# Patient Record
Sex: FEMALE | Race: WHITE | Employment: OTHER | URBAN - METROPOLITAN AREA
[De-identification: names, ages, dates, MRNs, and addresses within clinical notes are randomized per-mention and may not be internally consistent; named-entity substitution may affect disease eponyms.]

---

## 2017-10-27 ENCOUNTER — HOSPITAL ENCOUNTER (OUTPATIENT)
Dept: LAB | Age: 67
Discharge: HOME | End: 2017-10-27
Payer: COMMERCIAL

## 2017-10-27 LAB
25(OH)D3 SERPL-MCNC: 14.3 NG/ML (ref 30–100)
ALBUMIN SERPL-MCNC: 4.1 G/DL (ref 3.4–5)
ALBUMIN/GLOB SERPL: 1.1 {RATIO} (ref 0.8–1.7)
ALP SERPL-CCNC: 99 U/L (ref 45–117)
ALT SERPL-CCNC: 23 U/L (ref 13–56)
ANION GAP SERPL CALC-SCNC: 9 MMOL/L (ref 3–18)
APPEARANCE UR: CLEAR
AST SERPL-CCNC: 27 U/L (ref 15–37)
BACTERIA URNS QL MICRO: ABNORMAL /HPF
BASOPHILS # BLD: 0 K/UL (ref 0–0.06)
BASOPHILS NFR BLD: 1 % (ref 0–2)
BILIRUB SERPL-MCNC: 0.3 MG/DL (ref 0.2–1)
BILIRUB UR QL: NEGATIVE
BUN SERPL-MCNC: 21 MG/DL (ref 7–18)
BUN/CREAT SERPL: 24 (ref 12–20)
CALCIUM SERPL-MCNC: 9.3 MG/DL (ref 8.5–10.1)
CHLORIDE SERPL-SCNC: 103 MMOL/L (ref 100–108)
CHOLEST SERPL-MCNC: 190 MG/DL
CO2 SERPL-SCNC: 28 MMOL/L (ref 21–32)
COLOR UR: YELLOW
CREAT SERPL-MCNC: 0.88 MG/DL (ref 0.6–1.3)
DIFFERENTIAL METHOD BLD: ABNORMAL
EOSINOPHIL # BLD: 0.1 K/UL (ref 0–0.4)
EOSINOPHIL NFR BLD: 2 % (ref 0–5)
EPITH CASTS URNS QL MICRO: ABNORMAL /LPF (ref 0–5)
ERYTHROCYTE [DISTWIDTH] IN BLOOD BY AUTOMATED COUNT: 12.3 % (ref 11.6–14.5)
GLOBULIN SER CALC-MCNC: 3.6 G/DL (ref 2–4)
GLUCOSE SERPL-MCNC: 86 MG/DL (ref 74–99)
GLUCOSE UR STRIP.AUTO-MCNC: NEGATIVE MG/DL
HCT VFR BLD AUTO: 36.7 % (ref 35–45)
HDLC SERPL-MCNC: 108 MG/DL (ref 40–60)
HDLC SERPL: 1.8 {RATIO} (ref 0–5)
HGB BLD-MCNC: 12.4 G/DL (ref 12–16)
HGB UR QL STRIP: NEGATIVE
KETONES UR QL STRIP.AUTO: NEGATIVE MG/DL
LDLC SERPL CALC-MCNC: 68.2 MG/DL (ref 0–100)
LEUKOCYTE ESTERASE UR QL STRIP.AUTO: ABNORMAL
LIPID PROFILE,FLP: ABNORMAL
LYMPHOCYTES # BLD: 3.4 K/UL (ref 0.9–3.6)
LYMPHOCYTES NFR BLD: 46 % (ref 21–52)
MCH RBC QN AUTO: 31.1 PG (ref 24–34)
MCHC RBC AUTO-ENTMCNC: 33.8 G/DL (ref 31–37)
MCV RBC AUTO: 92 FL (ref 74–97)
MONOCYTES # BLD: 0.4 K/UL (ref 0.05–1.2)
MONOCYTES NFR BLD: 6 % (ref 3–10)
NEUTS SEG # BLD: 3.3 K/UL (ref 1.8–8)
NEUTS SEG NFR BLD: 45 % (ref 40–73)
NITRITE UR QL STRIP.AUTO: NEGATIVE
PH UR STRIP: 7 [PH] (ref 5–8)
PLATELET # BLD AUTO: 313 K/UL (ref 135–420)
PMV BLD AUTO: 9.6 FL (ref 9.2–11.8)
POTASSIUM SERPL-SCNC: 3.8 MMOL/L (ref 3.5–5.5)
PROT SERPL-MCNC: 7.7 G/DL (ref 6.4–8.2)
PROT UR STRIP-MCNC: NEGATIVE MG/DL
RBC # BLD AUTO: 3.99 M/UL (ref 4.2–5.3)
RBC #/AREA URNS HPF: 0 /HPF (ref 0–5)
SODIUM SERPL-SCNC: 140 MMOL/L (ref 136–145)
SP GR UR REFRACTOMETRY: 1.02 (ref 1–1.03)
TRIGL SERPL-MCNC: 69 MG/DL (ref ?–150)
TSH SERPL DL<=0.05 MIU/L-ACNC: 1.49 UIU/ML (ref 0.36–3.74)
UROBILINOGEN UR QL STRIP.AUTO: 0.2 EU/DL (ref 0.2–1)
VLDLC SERPL CALC-MCNC: 13.8 MG/DL
WBC # BLD AUTO: 7.3 K/UL (ref 4.6–13.2)
WBC URNS QL MICRO: ABNORMAL /HPF (ref 0–4)

## 2017-10-27 PROCEDURE — 82306 VITAMIN D 25 HYDROXY: CPT | Performed by: INTERNAL MEDICINE

## 2017-10-27 PROCEDURE — 80061 LIPID PANEL: CPT | Performed by: INTERNAL MEDICINE

## 2017-10-27 PROCEDURE — 84443 ASSAY THYROID STIM HORMONE: CPT | Performed by: INTERNAL MEDICINE

## 2017-10-27 PROCEDURE — 36415 COLL VENOUS BLD VENIPUNCTURE: CPT | Performed by: INTERNAL MEDICINE

## 2017-10-27 PROCEDURE — 85025 COMPLETE CBC W/AUTO DIFF WBC: CPT | Performed by: INTERNAL MEDICINE

## 2017-10-27 PROCEDURE — 80053 COMPREHEN METABOLIC PANEL: CPT | Performed by: INTERNAL MEDICINE

## 2017-10-27 PROCEDURE — 81001 URINALYSIS AUTO W/SCOPE: CPT | Performed by: INTERNAL MEDICINE

## 2017-11-01 ENCOUNTER — HOSPITAL ENCOUNTER (OUTPATIENT)
Dept: MAMMOGRAPHY | Age: 67
Discharge: HOME | End: 2017-11-01
Attending: INTERNAL MEDICINE
Payer: MEDICARE

## 2017-11-01 DIAGNOSIS — Z12.31 VISIT FOR SCREENING MAMMOGRAM: ICD-10-CM

## 2017-11-01 PROCEDURE — 77063 BREAST TOMOSYNTHESIS BI: CPT

## 2018-11-08 ENCOUNTER — HOSPITAL ENCOUNTER (OUTPATIENT)
Dept: LAB | Age: 68
Discharge: HOME | End: 2018-11-08
Payer: COMMERCIAL

## 2018-11-08 LAB
ANION GAP SERPL CALC-SCNC: 10 MMOL/L (ref 3–18)
BUN SERPL-MCNC: 34 MG/DL (ref 7–18)
BUN/CREAT SERPL: 34 (ref 12–20)
CALCIUM SERPL-MCNC: 9.1 MG/DL (ref 8.5–10.1)
CHLORIDE SERPL-SCNC: 108 MMOL/L (ref 100–108)
CO2 SERPL-SCNC: 25 MMOL/L (ref 21–32)
CREAT SERPL-MCNC: 1 MG/DL (ref 0.6–1.3)
GLUCOSE SERPL-MCNC: 89 MG/DL (ref 74–99)
POTASSIUM SERPL-SCNC: 3.9 MMOL/L (ref 3.5–5.5)
SODIUM SERPL-SCNC: 143 MMOL/L (ref 136–145)

## 2018-11-08 PROCEDURE — 80048 BASIC METABOLIC PNL TOTAL CA: CPT

## 2018-11-08 PROCEDURE — 36415 COLL VENOUS BLD VENIPUNCTURE: CPT

## 2019-01-04 ENCOUNTER — HOSPITAL ENCOUNTER (OUTPATIENT)
Dept: MAMMOGRAPHY | Age: 69
Discharge: HOME | End: 2019-01-04
Attending: INTERNAL MEDICINE
Payer: MEDICARE

## 2019-01-04 DIAGNOSIS — Z12.31 VISIT FOR SCREENING MAMMOGRAM: ICD-10-CM

## 2019-01-04 PROCEDURE — 77063 BREAST TOMOSYNTHESIS BI: CPT

## 2019-06-03 ENCOUNTER — HOSPITAL ENCOUNTER (OUTPATIENT)
Dept: CT IMAGING | Age: 69
Discharge: HOME | End: 2019-06-03
Attending: NURSE PRACTITIONER

## 2019-06-03 DIAGNOSIS — R91.1 PULMONARY NODULE: ICD-10-CM

## 2019-10-09 ENCOUNTER — HOSPITAL ENCOUNTER (INPATIENT)
Age: 69
LOS: 2 days | Discharge: HOME HEALTH | DRG: 482 | End: 2019-10-11
Attending: EMERGENCY MEDICINE | Admitting: HOSPITALIST
Payer: MEDICARE

## 2019-10-09 ENCOUNTER — APPOINTMENT (OUTPATIENT)
Dept: GENERAL RADIOLOGY | Age: 69
DRG: 482 | End: 2019-10-09
Attending: EMERGENCY MEDICINE
Payer: MEDICARE
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
## 2019-10-09 DIAGNOSIS — S72.002A CLOSED FRACTURE OF NECK OF LEFT FEMUR, INITIAL ENCOUNTER (HCC): Primary | ICD-10-CM

## 2019-10-09 DIAGNOSIS — I10 ESSENTIAL HYPERTENSION: ICD-10-CM

## 2019-10-09 PROBLEM — S72.009A HIP FRACTURE (HCC): Status: ACTIVE | Noted: 2019-10-09

## 2019-10-09 LAB
ABO + RH BLD: NORMAL
ALBUMIN SERPL-MCNC: 4.4 G/DL (ref 3.4–5)
ALBUMIN/GLOB SERPL: 1.3 {RATIO} (ref 0.8–1.7)
ALP SERPL-CCNC: 84 U/L (ref 45–117)
ALT SERPL-CCNC: 21 U/L (ref 13–56)
ANION GAP SERPL CALC-SCNC: 7 MMOL/L (ref 3–18)
APPEARANCE UR: CLEAR
APTT PPP: 32.1 SEC (ref 23–36.4)
AST SERPL-CCNC: 21 U/L (ref 10–38)
BACTERIA URNS QL MICRO: NEGATIVE /HPF
BASOPHILS # BLD: 0 K/UL (ref 0–0.1)
BASOPHILS NFR BLD: 0 % (ref 0–2)
BILIRUB SERPL-MCNC: 0.3 MG/DL (ref 0.2–1)
BILIRUB UR QL: NEGATIVE
BLOOD GROUP ANTIBODIES SERPL: NORMAL
BUN SERPL-MCNC: 38 MG/DL (ref 7–18)
BUN/CREAT SERPL: 34 (ref 12–20)
CALCIUM SERPL-MCNC: 9.9 MG/DL (ref 8.5–10.1)
CHLORIDE SERPL-SCNC: 103 MMOL/L (ref 100–111)
CO2 SERPL-SCNC: 27 MMOL/L (ref 21–32)
COLOR UR: YELLOW
CREAT SERPL-MCNC: 1.12 MG/DL (ref 0.6–1.3)
DIFFERENTIAL METHOD BLD: ABNORMAL
EOSINOPHIL # BLD: 0.1 K/UL (ref 0–0.4)
EOSINOPHIL NFR BLD: 1 % (ref 0–5)
EPITH CASTS URNS QL MICRO: NORMAL /LPF (ref 0–5)
ERYTHROCYTE [DISTWIDTH] IN BLOOD BY AUTOMATED COUNT: 12.4 % (ref 11.6–14.5)
GLOBULIN SER CALC-MCNC: 3.4 G/DL (ref 2–4)
GLUCOSE SERPL-MCNC: 112 MG/DL (ref 74–99)
GLUCOSE UR STRIP.AUTO-MCNC: NEGATIVE MG/DL
HCT VFR BLD AUTO: 36.1 % (ref 35–45)
HGB BLD-MCNC: 12 G/DL (ref 12–16)
HGB UR QL STRIP: NEGATIVE
INR PPP: 1 (ref 0.8–1.2)
KETONES UR QL STRIP.AUTO: NEGATIVE MG/DL
LEUKOCYTE ESTERASE UR QL STRIP.AUTO: NEGATIVE
LYMPHOCYTES # BLD: 2.1 K/UL (ref 0.9–3.6)
LYMPHOCYTES NFR BLD: 20 % (ref 21–52)
MCH RBC QN AUTO: 30.4 PG (ref 24–34)
MCHC RBC AUTO-ENTMCNC: 33.2 G/DL (ref 31–37)
MCV RBC AUTO: 91.4 FL (ref 74–97)
MONOCYTES # BLD: 0.7 K/UL (ref 0.05–1.2)
MONOCYTES NFR BLD: 6 % (ref 3–10)
NEUTS SEG # BLD: 7.8 K/UL (ref 1.8–8)
NEUTS SEG NFR BLD: 73 % (ref 40–73)
NITRITE UR QL STRIP.AUTO: NEGATIVE
PH UR STRIP: 5 [PH] (ref 5–8)
PLATELET # BLD AUTO: 278 K/UL (ref 135–420)
PMV BLD AUTO: 9.9 FL (ref 9.2–11.8)
POTASSIUM SERPL-SCNC: 3.8 MMOL/L (ref 3.5–5.5)
PROT SERPL-MCNC: 7.8 G/DL (ref 6.4–8.2)
PROT UR STRIP-MCNC: ABNORMAL MG/DL
PROTHROMBIN TIME: 13.2 SEC (ref 11.5–15.2)
RBC # BLD AUTO: 3.95 M/UL (ref 4.2–5.3)
RBC #/AREA URNS HPF: 0 /HPF (ref 0–5)
SODIUM SERPL-SCNC: 137 MMOL/L (ref 136–145)
SP GR UR REFRACTOMETRY: 1.02 (ref 1–1.03)
SPECIMEN EXP DATE BLD: NORMAL
UROBILINOGEN UR QL STRIP.AUTO: 0.2 EU/DL (ref 0.2–1)
WBC # BLD AUTO: 10.8 K/UL (ref 4.6–13.2)
WBC URNS QL MICRO: NORMAL /HPF (ref 0–4)

## 2019-10-09 PROCEDURE — 86900 BLOOD TYPING SEROLOGIC ABO: CPT

## 2019-10-09 PROCEDURE — 74011250636 HC RX REV CODE- 250/636

## 2019-10-09 PROCEDURE — 93005 ELECTROCARDIOGRAM TRACING: CPT

## 2019-10-09 PROCEDURE — 96374 THER/PROPH/DIAG INJ IV PUSH: CPT

## 2019-10-09 PROCEDURE — 80053 COMPREHEN METABOLIC PANEL: CPT

## 2019-10-09 PROCEDURE — 96375 TX/PRO/DX INJ NEW DRUG ADDON: CPT

## 2019-10-09 PROCEDURE — 85025 COMPLETE CBC W/AUTO DIFF WBC: CPT

## 2019-10-09 PROCEDURE — 85610 PROTHROMBIN TIME: CPT

## 2019-10-09 PROCEDURE — 81001 URINALYSIS AUTO W/SCOPE: CPT

## 2019-10-09 PROCEDURE — 99285 EMERGENCY DEPT VISIT HI MDM: CPT

## 2019-10-09 PROCEDURE — 73502 X-RAY EXAM HIP UNI 2-3 VIEWS: CPT

## 2019-10-09 PROCEDURE — 85730 THROMBOPLASTIN TIME PARTIAL: CPT

## 2019-10-09 PROCEDURE — 77030038269 HC DRN EXT URIN PURWCK BARD -A

## 2019-10-09 PROCEDURE — 74011250636 HC RX REV CODE- 250/636: Performed by: HOSPITALIST

## 2019-10-09 PROCEDURE — 65270000029 HC RM PRIVATE

## 2019-10-09 PROCEDURE — 74011250637 HC RX REV CODE- 250/637: Performed by: HOSPITALIST

## 2019-10-09 RX ORDER — LISINOPRIL 40 MG/1
40 TABLET ORAL DAILY
Status: DISCONTINUED | OUTPATIENT
Start: 2019-10-09 | End: 2019-10-09

## 2019-10-09 RX ORDER — MORPHINE SULFATE 2 MG/ML
2 INJECTION, SOLUTION INTRAMUSCULAR; INTRAVENOUS
Status: DISCONTINUED | OUTPATIENT
Start: 2019-10-09 | End: 2019-10-11 | Stop reason: HOSPADM

## 2019-10-09 RX ORDER — SIMVASTATIN 40 MG/1
40 TABLET, FILM COATED ORAL
Status: DISCONTINUED | OUTPATIENT
Start: 2019-10-09 | End: 2019-10-11 | Stop reason: HOSPADM

## 2019-10-09 RX ORDER — HYDROCHLOROTHIAZIDE 25 MG/1
25 TABLET ORAL DAILY
Status: DISCONTINUED | OUTPATIENT
Start: 2019-10-09 | End: 2019-10-09

## 2019-10-09 RX ORDER — MORPHINE SULFATE 2 MG/ML
2 INJECTION, SOLUTION INTRAMUSCULAR; INTRAVENOUS
Status: COMPLETED | OUTPATIENT
Start: 2019-10-09 | End: 2019-10-09

## 2019-10-09 RX ORDER — ACETAMINOPHEN 325 MG/1
650 TABLET ORAL
Status: DISCONTINUED | OUTPATIENT
Start: 2019-10-09 | End: 2019-10-11 | Stop reason: HOSPADM

## 2019-10-09 RX ORDER — HEPARIN SODIUM 5000 [USP'U]/ML
5000 INJECTION, SOLUTION INTRAVENOUS; SUBCUTANEOUS EVERY 8 HOURS
Status: DISCONTINUED | OUTPATIENT
Start: 2019-10-09 | End: 2019-10-11 | Stop reason: HOSPADM

## 2019-10-09 RX ORDER — HYDROCHLOROTHIAZIDE 25 MG/1
25 TABLET ORAL DAILY
Status: DISCONTINUED | OUTPATIENT
Start: 2019-10-10 | End: 2019-10-09

## 2019-10-09 RX ORDER — HYDROCHLOROTHIAZIDE 25 MG/1
25 TABLET ORAL DAILY
Status: DISCONTINUED | OUTPATIENT
Start: 2019-10-10 | End: 2019-10-11 | Stop reason: HOSPADM

## 2019-10-09 RX ORDER — HYDRALAZINE HYDROCHLORIDE 20 MG/ML
10 INJECTION INTRAMUSCULAR; INTRAVENOUS ONCE
Status: COMPLETED | OUTPATIENT
Start: 2019-10-10 | End: 2019-10-10

## 2019-10-09 RX ORDER — MORPHINE SULFATE 2 MG/ML
INJECTION, SOLUTION INTRAMUSCULAR; INTRAVENOUS
Status: COMPLETED
Start: 2019-10-09 | End: 2019-10-09

## 2019-10-09 RX ORDER — SODIUM CHLORIDE 9 MG/ML
75 INJECTION, SOLUTION INTRAVENOUS CONTINUOUS
Status: DISCONTINUED | OUTPATIENT
Start: 2019-10-09 | End: 2019-10-09

## 2019-10-09 RX ORDER — LISINOPRIL 40 MG/1
40 TABLET ORAL DAILY
Status: DISCONTINUED | OUTPATIENT
Start: 2019-10-10 | End: 2019-10-11 | Stop reason: HOSPADM

## 2019-10-09 RX ORDER — LISINOPRIL 20 MG/1
20 TABLET ORAL DAILY
COMMUNITY

## 2019-10-09 RX ORDER — LISINOPRIL 40 MG/1
40 TABLET ORAL DAILY
Status: DISCONTINUED | OUTPATIENT
Start: 2019-10-10 | End: 2019-10-09

## 2019-10-09 RX ORDER — ONDANSETRON 2 MG/ML
4 INJECTION INTRAMUSCULAR; INTRAVENOUS
Status: COMPLETED | OUTPATIENT
Start: 2019-10-09 | End: 2019-10-09

## 2019-10-09 RX ORDER — ONDANSETRON 2 MG/ML
INJECTION INTRAMUSCULAR; INTRAVENOUS
Status: COMPLETED
Start: 2019-10-09 | End: 2019-10-09

## 2019-10-09 RX ORDER — NALOXONE HYDROCHLORIDE 0.4 MG/ML
0.4 INJECTION, SOLUTION INTRAMUSCULAR; INTRAVENOUS; SUBCUTANEOUS AS NEEDED
Status: DISCONTINUED | OUTPATIENT
Start: 2019-10-09 | End: 2019-10-11 | Stop reason: HOSPADM

## 2019-10-09 RX ORDER — SODIUM CHLORIDE 9 MG/ML
75 INJECTION, SOLUTION INTRAVENOUS CONTINUOUS
Status: DISCONTINUED | OUTPATIENT
Start: 2019-10-10 | End: 2019-10-11 | Stop reason: HOSPADM

## 2019-10-09 RX ADMIN — HEPARIN SODIUM 5000 UNITS: 5000 INJECTION INTRAVENOUS; SUBCUTANEOUS at 23:11

## 2019-10-09 RX ADMIN — SODIUM CHLORIDE 75 ML/HR: 900 INJECTION, SOLUTION INTRAVENOUS at 23:32

## 2019-10-09 RX ADMIN — ONDANSETRON 4 MG: 2 INJECTION INTRAMUSCULAR; INTRAVENOUS at 15:14

## 2019-10-09 RX ADMIN — MORPHINE SULFATE 2 MG: 2 INJECTION, SOLUTION INTRAMUSCULAR; INTRAVENOUS at 23:11

## 2019-10-09 RX ADMIN — MORPHINE SULFATE 2 MG: 2 INJECTION, SOLUTION INTRAMUSCULAR; INTRAVENOUS at 15:15

## 2019-10-09 RX ADMIN — SIMVASTATIN 40 MG: 40 TABLET, FILM COATED ORAL at 23:11

## 2019-10-09 NOTE — ED PROVIDER NOTES
EMERGENCY DEPARTMENT HISTORY AND PHYSICAL EXAM    2:58 PM      Date: 10/9/2019  Patient Name: Carolann Marsh    History of Presenting Illness     Chief Complaint   Patient presents with    Hip Pain    Fall         HISTORY: Carolann Marsh is a 77 y.o. female with medical history as listed below who presents with left hip pain since this morning after tripping and falling on a dog leash. It on her left hip. She has not been able to bear weight since the accident. A neighbor helped lift her up into her truck. She does not take a blood thinner. She does have a history of a right hip replacement. She denies hitting her head or losing consciousness. She denies pain elsewhere. She says the pain is only when she moves. PCP: Renu Cedeño MD    Current Outpatient Medications   Medication Sig Dispense Refill    lisinopril (PRINIVIL, ZESTRIL) 20 mg tablet Take 20 mg by mouth daily.  simvastatin (ZOCOR) 40 mg tablet       lisinopril-hydrochlorothiazide (PRINZIDE, ZESTORETIC) 20-25 mg per tablet          Past History     Past Medical History:  Past Medical History:   Diagnosis Date    Arthritis     Hypertension        Past Surgical History:  Past Surgical History:   Procedure Laterality Date    HX LUMBAR FUSION      1968    HX ORTHOPAEDIC      right hip replaced 2004       Family History:  Family History   Problem Relation Age of Onset    Breast Cancer Paternal Aunt        Social History:  Social History     Tobacco Use    Smoking status: Current Every Day Smoker     Packs/day: 0.25    Smokeless tobacco: Never Used   Substance Use Topics    Alcohol use: Yes     Comment: occassional    Drug use: No       Allergies: Allergies   Allergen Reactions    Codeine Itching     In addition to itching, pt said it feels like bugs are crawling in her whole body           Review of Systems       Review of Systems   Constitutional: Negative for chills. HENT: Negative for congestion and sore throat. Respiratory: Negative for cough and shortness of breath. Cardiovascular: Negative for chest pain. Gastrointestinal: Negative for abdominal pain, diarrhea, nausea and vomiting. Genitourinary: Negative for dysuria. Musculoskeletal: Positive for arthralgias. Negative for back pain. Skin: Negative for rash. Neurological: Negative for dizziness and headaches. All other systems reviewed and are negative. Physical Exam     Visit Vitals  BP (!) 214/104 (BP 1 Location: Right arm, BP Patient Position: At rest;Sitting) Comment: took meds today   Pulse (!) 103   Temp 98.8 °F (37.1 °C)   Resp 16   Ht 5' 1\" (1.549 m)   Wt 40.8 kg (90 lb)   SpO2 98%   BMI 17.01 kg/m²     Physical Exam  General Exam: Patient is a well developed and well nourished in no distress. Patient does not appear acutely ill or toxic. Elderly, frail-appearing  Eye Exam: Lids and conjunctiva are normal  ENT Exam: The general head and facial exam is normal.  The neck is supple without meningeal signs. No significant adenopathy. Pulmonary Exam: No respiratory distress. The respiratory rate is normal.  No stridor. The breath sounds are equal bilaterally. There are no wheezes, rales, or rhonchi noted. Cardiac Exam: The cardiac rate and rhythm are normal.  No significant murmurs, rubs, or gallops. The peripheral pulses of the upper extremities are normal.  Abdominal Exam: Abdomen is soft and non-distended. No pulsatile masses. There is no local tenderness. There is no rebound or guarding noted. Skin and Soft Tissue: The skin is warm and dry, without significant abnormality. Good color. Musculoskeletal Exam: No peripheral edema. Left hip pain, remedy does not appear shortened or rotated, intact distal pulses, sensation and motor intact distally  Psychiatric: Normal adult with appropriate demeanor and interpersonal interaction. Is oriented to person, place, and time.       Diagnostic Study Results     Labs -  No results found for this or any previous visit (from the past 12 hour(s)). Radiologic Studies -   XR HIP LT W OR WO PELV 2-3 VWS   Final Result   IMPRESSION:      Subtle impacted subcapital femoral neck fracture. Medical Decision Making   I am the first provider for this patient. I reviewed the vital signs, available nursing notes, past medical history, past surgical history, family history and social history. Vital Signs-Reviewed the patient's vital signs. EKG: Interpreted by the EP. EKG performed at 1642. Interpretation 95, normal sinus rhythm, no STEMI, no ST depressions    Records Reviewed: Nursing Notes (Time of Review: 2:58 PM)    ED Course: Progress Notes, Reevaluation, and Consults:      Provider Notes (Medical Decision Making): Patient presenting for evaluation of hip pain. She had a mechanical fall at home. She did not hit her head. She denies any precipitating symptoms causing her to fall and states it was mechanical by tripping over her dog leash. X-ray demonstrates a left femoral neck fracture. Neurovascularly intact. She is not on a blood thinner. Blood pressure is elevated but does slightly come down after receiving pain medication. Do not think she needs this emergently lowered. Labs reviewed. Case discussed with orthopedic surgery and hospitalist.  Patient agreeable to admission. Diagnosis     Clinical Impression:   1. Closed fracture of neck of left femur, initial encounter ([facility] [address]    2. Essential hypertension    3. Fall on same level from slipping, tripping or stumbling, initial encounter        Disposition: ADMIT    Follow-up Information    None          Patient's Medications   Start Taking    No medications on file   Continue Taking    LISINOPRIL (PRINIVIL, ZESTRIL) 20 MG TABLET    Take 20 mg by mouth daily.     LISINOPRIL-HYDROCHLOROTHIAZIDE (PRINZIDE, ZESTORETIC) 20-25 MG PER TABLET        SIMVASTATIN (ZOCOR) 40 MG TABLET       These Medications have changed    No medications on file   Stop Taking    LORAZEPAM (ATIVAN) 0.5 MG TABLET        OXYCODONE-ACETAMINOPHEN (PERCOCET) 5-325 MG PER TABLET    Take 1 tablet every 4-6 hours as needed for pain control. If you were instructed to try over the counter ibuprofen or tylenol, only take the percocet for pain not controlled with the over the counter medication. _______________________________    Please note that this dictation was completed with Done., the computer voice recognition software. Quite often unanticipated grammatical, syntax, homophones, and other interpretive errors are inadvertently transcribed by the computer software. Please disregard these errors. Please excuse any errors that have escaped final proofreading.

## 2019-10-09 NOTE — ED NOTES
Patient is now leaving for inpatient unit. Water has been given per 1530 order for regular diet. No acute distress noted to patient at this time. Vitals stable. BP at home is 145-179 per spouse. Taken to in patient room via ED tech. Patient's spouse has belongings.

## 2019-10-09 NOTE — H&P
History and Physical    Patient: Chata Contreras               Sex: female          DOA: 10/9/2019       YOB: 1950      Age:  77 y.o.        LOS:  LOS: 0 days        CHIEF COMPLAINT: Hip Pain and Fall    Assessment/Plan:     Active Problems:    Hip fracture ([address] (10/9/2019)      Essential hypertension (10/9/2019)      PLAN:  Left hip fracture  Ortho consulted, NPO at midnight  Pain control  IV fluids    Pre-operative cardiac and pulmonary assessment  Surgery: left hip hemiarthroplasty? RCRI = 0  (0.4% risk of major cardiac event)  METS >4  ECG: normal sinus rhythm, no pathologic Q waves    No angina/angina-equivalent/CHF symptoms  No h/o COPD/asthma, or home O2 requirement  No h/o CKD or DM2  No h/o CVA or TIA    Recommendations:  Pt is at low-moderate risk for major linda-operative cardiac events mostly due to age. Given surgery is a moderate risk surgery and for an emergent/urgent condition, patient is at appropriate risk to proceed with no absolute contraindications at this time. Essential HTN  Elevated, likely due to pain. Cont home BP meds and monitor closely    Tobacco abuse  Chronic smoker, recommend to quit smoking    DVT prophylaxis  SubQ heparin      HPI:     Chata Contreras is a 77 y.o. female with PMH of HTN who presented to the ED due to fall and hip pain. She said she was taking her dog to the vet when she tripped over the leash and landed on her left hip. She developed severe pain, she was able to get up and sat in the sofa with the help of her friend. She was brought to the ED for evaluation. XR shows subtle impacted subcapital femoral neck fracture. Ortho consulted, pt was admitted for further management. Prior to today pt states she is active and is able to walk and go up a flight of stairs without chest pain or SOB.      Past Medical History:   Diagnosis Date    Arthritis     Hypertension        Social History:  Social History     Socioeconomic History    Marital status: SINGLE     Spouse name: Not on file    Number of children: Not on file    Years of education: Not on file    Highest education level: Not on file   Occupational History    Not on file   Social Needs    Financial resource strain: Not on file    Food insecurity:     Worry: Not on file     Inability: Not on file    Transportation needs:     Medical: Not on file     Non-medical: Not on file   Tobacco Use    Smoking status: Current Every Day Smoker     Packs/day: 0.25    Smokeless tobacco: Never Used   Substance and Sexual Activity    Alcohol use: Yes     Comment: occassional    Drug use: No    Sexual activity: Not on file   Lifestyle    Physical activity:     Days per week: Not on file     Minutes per session: Not on file    Stress: Not on file   Relationships    Social connections:     Talks on phone: Not on file     Gets together: Not on file     Attends Sabianism service: Not on file     Active member of club or organization: Not on file     Attends meetings of clubs or organizations: Not on file     Relationship status: Not on file    Intimate partner violence:     Fear of current or ex partner: Not on file     Emotionally abused: Not on file     Physically abused: Not on file     Forced sexual activity: Not on file   Other Topics Concern    Not on file   Social History Narrative    Not on file       Family History:  Family History   Problem Relation Age of Onset    Breast Cancer Paternal Aunt        Allergies: Allergies   Allergen Reactions    Codeine Itching     In addition to itching, pt said it feels like bugs are crawling in her whole body         Home Medications:  Prior to Admission medications    Medication Sig Start Date End Date Taking? Authorizing Provider   lisinopril (PRINIVIL, ZESTRIL) 20 mg tablet Take 20 mg by mouth daily.    Yes Other, MD Dada   simvastatin (ZOCOR) 40 mg tablet  8/11/14   Other, MD Dada   lisinopril-hydrochlorothiazide (PRINZIDE, ZESTORETIC) 20-25 mg per tablet 7/25/14   OtherDada MD         Review of Systems  Review of Systems   Constitutional: Negative for chills and fever. HENT: Negative for congestion and hearing loss. Eyes: Negative for blurred vision and double vision. Respiratory: Negative for cough and shortness of breath. Cardiovascular: Negative for chest pain and palpitations. Gastrointestinal: Negative for abdominal pain, nausea and vomiting. Genitourinary: Negative for dysuria and hematuria. Musculoskeletal: Positive for joint pain. Negative for falls and myalgias. Skin: Negative for rash. Neurological: Negative for dizziness and focal weakness. Psychiatric/Behavioral: Negative. Objective:      Visit Vitals  BP (!) 179/101   Pulse 95   Temp 98.5 °F (36.9 °C)   Resp 20   Ht 5' 1\" (1.549 m)   Wt 40.8 kg (90 lb)   SpO2 97%   BMI 17.01 kg/m²       Physical Exam:  Ears: hearing is intact  Eyes: Icterus is not present  Lungs: clear to auscultation bilaterally  Heart: regular rate and rhythm, S1, S2 normal, no murmur, click, rub or gallop  Gastrointestinal: soft, non-tender.  Bowel sounds normal. No masses,  no organomegaly  Neurological:  New Focal Deficits are not present  Psychiatric:  Mood is stable    Laboratory Studies:  CMP:   Lab Results   Component Value Date/Time     10/09/2019 03:23 PM    K 3.8 10/09/2019 03:23 PM     10/09/2019 03:23 PM    CO2 27 10/09/2019 03:23 PM    AGAP 7 10/09/2019 03:23 PM     (H) 10/09/2019 03:23 PM    BUN 38 (H) 10/09/2019 03:23 PM    CREA 1.12 10/09/2019 03:23 PM    GFRAA 58 (L) 10/09/2019 03:23 PM    GFRNA 48 (L) 10/09/2019 03:23 PM    CA 9.9 10/09/2019 03:23 PM    ALB 4.4 10/09/2019 03:23 PM    TP 7.8 10/09/2019 03:23 PM    GLOB 3.4 10/09/2019 03:23 PM    AGRAT 1.3 10/09/2019 03:23 PM    SGOT 21 10/09/2019 03:23 PM    ALT 21 10/09/2019 03:23 PM     CBC:   Lab Results   Component Value Date/Time    WBC 10.8 10/09/2019 03:23 PM    HGB 12.0 10/09/2019 03:23 PM    HCT 36.1 10/09/2019 03:23 PM     10/09/2019 03:23 PM     All Cardiac Markers in the last 24 hours: No results found for: CPK, CK, CKMMB, CKMB, RCK3, CKMBT, CKNDX, CKND1, JAMARCUS, TROPT, TROIQ, PURA, TROPT, TNIPOC, BNP, BNPP    Imaging:  XR HIP LT W OR WO PELV 2-3 VWS   Final Result   IMPRESSION:      Subtle impacted subcapital femoral neck fracture.

## 2019-10-10 ENCOUNTER — ANESTHESIA (OUTPATIENT)
Dept: SURGERY | Age: 69
DRG: 482 | End: 2019-10-10
Payer: MEDICARE

## 2019-10-10 ENCOUNTER — ANESTHESIA EVENT (OUTPATIENT)
Dept: SURGERY | Age: 69
DRG: 482 | End: 2019-10-10
Payer: MEDICARE

## 2019-10-10 ENCOUNTER — APPOINTMENT (OUTPATIENT)
Dept: GENERAL RADIOLOGY | Age: 69
DRG: 482 | End: 2019-10-10
Attending: ORTHOPAEDIC SURGERY
Payer: MEDICARE

## 2019-10-10 ENCOUNTER — APPOINTMENT (OUTPATIENT)
Dept: CT IMAGING | Age: 69
DRG: 482 | End: 2019-10-10
Attending: PHYSICIAN ASSISTANT
Payer: MEDICARE

## 2019-10-10 LAB
ANION GAP SERPL CALC-SCNC: 11 MMOL/L (ref 3–18)
ATRIAL RATE: 95 BPM
BASOPHILS # BLD: 0 K/UL (ref 0–0.1)
BASOPHILS NFR BLD: 0 % (ref 0–2)
BUN SERPL-MCNC: 22 MG/DL (ref 7–18)
BUN/CREAT SERPL: 30 (ref 12–20)
CALCIUM SERPL-MCNC: 8.6 MG/DL (ref 8.5–10.1)
CALCULATED P AXIS, ECG09: 71 DEGREES
CALCULATED R AXIS, ECG10: 19 DEGREES
CALCULATED T AXIS, ECG11: 60 DEGREES
CHLORIDE SERPL-SCNC: 104 MMOL/L (ref 100–111)
CO2 SERPL-SCNC: 24 MMOL/L (ref 21–32)
CREAT SERPL-MCNC: 0.73 MG/DL (ref 0.6–1.3)
DIAGNOSIS, 93000: NORMAL
DIFFERENTIAL METHOD BLD: ABNORMAL
EOSINOPHIL # BLD: 0 K/UL (ref 0–0.4)
EOSINOPHIL NFR BLD: 0 % (ref 0–5)
ERYTHROCYTE [DISTWIDTH] IN BLOOD BY AUTOMATED COUNT: 12.4 % (ref 11.6–14.5)
GLUCOSE SERPL-MCNC: 120 MG/DL (ref 74–99)
HCT VFR BLD AUTO: 33.5 % (ref 35–45)
HGB BLD-MCNC: 11.2 G/DL (ref 12–16)
LYMPHOCYTES # BLD: 1.5 K/UL (ref 0.9–3.6)
LYMPHOCYTES NFR BLD: 13 % (ref 21–52)
MAGNESIUM SERPL-MCNC: 1.3 MG/DL (ref 1.6–2.6)
MCH RBC QN AUTO: 30.4 PG (ref 24–34)
MCHC RBC AUTO-ENTMCNC: 33.4 G/DL (ref 31–37)
MCV RBC AUTO: 90.8 FL (ref 74–97)
MONOCYTES # BLD: 0.6 K/UL (ref 0.05–1.2)
MONOCYTES NFR BLD: 5 % (ref 3–10)
NEUTS SEG # BLD: 9.6 K/UL (ref 1.8–8)
NEUTS SEG NFR BLD: 82 % (ref 40–73)
P-R INTERVAL, ECG05: 130 MS
PHOSPHATE SERPL-MCNC: 2.5 MG/DL (ref 2.5–4.9)
PLATELET # BLD AUTO: 267 K/UL (ref 135–420)
PMV BLD AUTO: 10 FL (ref 9.2–11.8)
POTASSIUM SERPL-SCNC: 3.5 MMOL/L (ref 3.5–5.5)
Q-T INTERVAL, ECG07: 354 MS
QRS DURATION, ECG06: 70 MS
QTC CALCULATION (BEZET), ECG08: 444 MS
RBC # BLD AUTO: 3.69 M/UL (ref 4.2–5.3)
SODIUM SERPL-SCNC: 139 MMOL/L (ref 136–145)
VENTRICULAR RATE, ECG03: 95 BPM
WBC # BLD AUTO: 11.8 K/UL (ref 4.6–13.2)

## 2019-10-10 PROCEDURE — 83735 ASSAY OF MAGNESIUM: CPT

## 2019-10-10 PROCEDURE — 74011250636 HC RX REV CODE- 250/636: Performed by: HOSPITALIST

## 2019-10-10 PROCEDURE — 65270000029 HC RM PRIVATE

## 2019-10-10 PROCEDURE — 85025 COMPLETE CBC W/AUTO DIFF WBC: CPT

## 2019-10-10 PROCEDURE — 73700 CT LOWER EXTREMITY W/O DYE: CPT

## 2019-10-10 PROCEDURE — 80048 BASIC METABOLIC PNL TOTAL CA: CPT

## 2019-10-10 PROCEDURE — C1713 ANCHOR/SCREW BN/BN,TIS/BN: HCPCS | Performed by: ORTHOPAEDIC SURGERY

## 2019-10-10 PROCEDURE — 73502 X-RAY EXAM HIP UNI 2-3 VIEWS: CPT

## 2019-10-10 PROCEDURE — 74011250637 HC RX REV CODE- 250/637: Performed by: HOSPITALIST

## 2019-10-10 PROCEDURE — 74011000250 HC RX REV CODE- 250

## 2019-10-10 PROCEDURE — 74011000272 HC RX REV CODE- 272: Performed by: ORTHOPAEDIC SURGERY

## 2019-10-10 PROCEDURE — 76010000149 HC OR TIME 1 TO 1.5 HR: Performed by: ORTHOPAEDIC SURGERY

## 2019-10-10 PROCEDURE — 36415 COLL VENOUS BLD VENIPUNCTURE: CPT

## 2019-10-10 PROCEDURE — 77030018836 HC SOL IRR NACL ICUM -A: Performed by: ORTHOPAEDIC SURGERY

## 2019-10-10 PROCEDURE — 77030031139 HC SUT VCRL2 J&J -A: Performed by: ORTHOPAEDIC SURGERY

## 2019-10-10 PROCEDURE — 74011250636 HC RX REV CODE- 250/636: Performed by: ORTHOPAEDIC SURGERY

## 2019-10-10 PROCEDURE — 76060000033 HC ANESTHESIA 1 TO 1.5 HR: Performed by: ORTHOPAEDIC SURGERY

## 2019-10-10 PROCEDURE — 77030038269 HC DRN EXT URIN PURWCK BARD -A

## 2019-10-10 PROCEDURE — 74011250636 HC RX REV CODE- 250/636

## 2019-10-10 PROCEDURE — 74011250636 HC RX REV CODE- 250/636: Performed by: FAMILY MEDICINE

## 2019-10-10 PROCEDURE — 77030012510 HC MSK AIRWY LMA TELE -B: Performed by: ANESTHESIOLOGY

## 2019-10-10 PROCEDURE — 0QS704Z REPOSITION LEFT UPPER FEMUR WITH INTERNAL FIXATION DEVICE, OPEN APPROACH: ICD-10-PCS | Performed by: ORTHOPAEDIC SURGERY

## 2019-10-10 PROCEDURE — 77030013079 HC BLNKT BAIR HGGR 3M -A: Performed by: ANESTHESIOLOGY

## 2019-10-10 PROCEDURE — 84100 ASSAY OF PHOSPHORUS: CPT

## 2019-10-10 PROCEDURE — 77030008462 HC STPLR SKN PROX J&J -A: Performed by: ORTHOPAEDIC SURGERY

## 2019-10-10 PROCEDURE — 77030016458 HC BIT DRL CANN1 SYNT -F: Performed by: ORTHOPAEDIC SURGERY

## 2019-10-10 PROCEDURE — C1769 GUIDE WIRE: HCPCS | Performed by: ORTHOPAEDIC SURGERY

## 2019-10-10 PROCEDURE — 76210000006 HC OR PH I REC 0.5 TO 1 HR: Performed by: ORTHOPAEDIC SURGERY

## 2019-10-10 DEVICE — SCREW BNE L80MM DIA7.3MM THRD L16MM CANC S STL SELF DRL ST: Type: IMPLANTABLE DEVICE | Site: HIP | Status: FUNCTIONAL

## 2019-10-10 DEVICE — WASHER ORTH DIA13MM FOR CANN SCR: Type: IMPLANTABLE DEVICE | Site: HIP | Status: FUNCTIONAL

## 2019-10-10 DEVICE — SCREW BNE L75MM DIA7.3MM THRD L16MM CANC S STL ST SELF DRL: Type: IMPLANTABLE DEVICE | Site: HIP | Status: FUNCTIONAL

## 2019-10-10 RX ORDER — LABETALOL HCL 20 MG/4 ML
20 SYRINGE (ML) INTRAVENOUS
Status: DISCONTINUED | OUTPATIENT
Start: 2019-10-10 | End: 2019-10-11 | Stop reason: HOSPADM

## 2019-10-10 RX ORDER — CEFAZOLIN SODIUM IN 0.9 % NACL 2 G/100 ML
PLASTIC BAG, INJECTION (ML) INTRAVENOUS AS NEEDED
Status: DISCONTINUED | OUTPATIENT
Start: 2019-10-10 | End: 2019-10-10 | Stop reason: HOSPADM

## 2019-10-10 RX ORDER — LIDOCAINE HYDROCHLORIDE 20 MG/ML
INJECTION, SOLUTION EPIDURAL; INFILTRATION; INTRACAUDAL; PERINEURAL AS NEEDED
Status: DISCONTINUED | OUTPATIENT
Start: 2019-10-10 | End: 2019-10-10 | Stop reason: HOSPADM

## 2019-10-10 RX ORDER — FENTANYL CITRATE 50 UG/ML
INJECTION, SOLUTION INTRAMUSCULAR; INTRAVENOUS AS NEEDED
Status: DISCONTINUED | OUTPATIENT
Start: 2019-10-10 | End: 2019-10-10 | Stop reason: HOSPADM

## 2019-10-10 RX ORDER — LABETALOL HCL 20 MG/4 ML
20 SYRINGE (ML) INTRAVENOUS ONCE
Status: COMPLETED | OUTPATIENT
Start: 2019-10-10 | End: 2019-10-10

## 2019-10-10 RX ORDER — ONDANSETRON 2 MG/ML
4 INJECTION INTRAMUSCULAR; INTRAVENOUS
Status: DISCONTINUED | OUTPATIENT
Start: 2019-10-10 | End: 2019-10-11 | Stop reason: HOSPADM

## 2019-10-10 RX ORDER — SODIUM CHLORIDE, SODIUM LACTATE, POTASSIUM CHLORIDE, CALCIUM CHLORIDE 600; 310; 30; 20 MG/100ML; MG/100ML; MG/100ML; MG/100ML
75 INJECTION, SOLUTION INTRAVENOUS CONTINUOUS
Status: DISCONTINUED | OUTPATIENT
Start: 2019-10-11 | End: 2019-10-11 | Stop reason: HOSPADM

## 2019-10-10 RX ORDER — FENTANYL CITRATE 50 UG/ML
50 INJECTION, SOLUTION INTRAMUSCULAR; INTRAVENOUS
Status: DISCONTINUED | OUTPATIENT
Start: 2019-10-10 | End: 2019-10-11 | Stop reason: HOSPADM

## 2019-10-10 RX ORDER — PROPOFOL 10 MG/ML
INJECTION, EMULSION INTRAVENOUS AS NEEDED
Status: DISCONTINUED | OUTPATIENT
Start: 2019-10-10 | End: 2019-10-10 | Stop reason: HOSPADM

## 2019-10-10 RX ADMIN — LABETALOL 20 MG/4 ML (5 MG/ML) INTRAVENOUS SYRINGE 20 MG: at 09:06

## 2019-10-10 RX ADMIN — HYDRALAZINE HYDROCHLORIDE 10 MG: 20 INJECTION INTRAMUSCULAR; INTRAVENOUS at 00:00

## 2019-10-10 RX ADMIN — Medication 2 G: at 22:49

## 2019-10-10 RX ADMIN — LIDOCAINE HYDROCHLORIDE 50 MG: 20 INJECTION, SOLUTION EPIDURAL; INFILTRATION; INTRACAUDAL; PERINEURAL at 22:43

## 2019-10-10 RX ADMIN — FENTANYL CITRATE 50 MCG: 50 INJECTION, SOLUTION INTRAMUSCULAR; INTRAVENOUS at 23:01

## 2019-10-10 RX ADMIN — LISINOPRIL 40 MG: 40 TABLET ORAL at 09:06

## 2019-10-10 RX ADMIN — PROPOFOL 100 MG: 10 INJECTION, EMULSION INTRAVENOUS at 22:43

## 2019-10-10 RX ADMIN — SODIUM CHLORIDE 75 ML/HR: 900 INJECTION, SOLUTION INTRAVENOUS at 11:25

## 2019-10-10 RX ADMIN — FENTANYL CITRATE 50 MCG: 50 INJECTION, SOLUTION INTRAMUSCULAR; INTRAVENOUS at 22:36

## 2019-10-10 RX ADMIN — MORPHINE SULFATE 2 MG: 2 INJECTION, SOLUTION INTRAMUSCULAR; INTRAVENOUS at 13:28

## 2019-10-10 RX ADMIN — HYDROCHLOROTHIAZIDE 25 MG: 25 TABLET ORAL at 09:06

## 2019-10-10 RX ADMIN — SODIUM CHLORIDE: 900 INJECTION, SOLUTION INTRAVENOUS at 23:31

## 2019-10-10 NOTE — PROGRESS NOTES
conducted a pre-surgery visit with Makayla Jeff, who is a 77 y.o.,female. The  provided the following Interventions:  Initiated a relationship of care and support. Offered prayer and assurance of continued prayers on patient's behalf. Plan:  Chaplains will continue to follow and will provide pastoral care on an as needed/requested basis.  recommends bedside caregivers page  on duty if patient shows signs of acute spiritual or emotional distress.     Marlon Solorzano   Spiritual Care   [phone]

## 2019-10-10 NOTE — PROGRESS NOTES
Problem: Discharge Planning  Goal: *Discharge to safe environment  Outcome: Progressing Towards Goal    Care Management Interventions  PCP Verified by CM: Yes  Palliative Care Criteria Met (RRAT>21 & CHF Dx)?: No  Transition of Care Consult (CM Consult): Discharge Planning  Current Support Network: Other(Roomate)  Plan discussed with Pt/Family/Caregiver: Yes  Discharge Location  Discharge Placement: Other:(HH vs SNF)       Reason for Admission:   Hip fracture ([facility] [address] (10/9/2019)                    RRAT Score:   8                  Plan for utilizing home health:  HH Vs SNF                        Current Advanced Directive/Advance Care Plan: Not on file and not interested at this time. Transition of Care Plan:        Spoke with patient in room, she stated that she was independent prior to admission and used no DME. However has a walker, cane and crutches from prior events. She lives with a roommate. She verified her demographics, insurance and PCP as correct on the facesheet. Patient has designated __friend/ roommate Nakita Ta [phone], NOK sister Brielle Weiss __[phone]____________________ to participate in his/her discharge plan and to receive any needed information. Plan per patient is to return home. Possible hh vs SNF will need Pt/OT to evaluate needs. Possible OR today. Tenatively Matched to [facility] if SNF need Amy/Liason following and can accept awaiting PT/OT jazmin.

## 2019-10-10 NOTE — PROGRESS NOTES
NUTRITION INITIAL ASSESSMENT/PLAN OF CARE      RECOMMENDATIONS:   1. NPO: diet to be resumed when medically indicated and per Pt tolerance  2. Monitor labs, weight and PO intake  3. RD to follow     GOALS:   1. PO intake meets >75% of protein/calorie needs by 10/15  2. Weight Maintenance/gradual gain (1-2 lb/week) by 10/17      ASSESSMENT:   Wt status is classified as underweight per Body mass index is 17.01 kg/m². Currently NPO for surgery but adequate PO prior. Labs noted. Pt w/ hypomagnesemia and elevated BUN. Nutrition recommendations listed. RD to follow. Nutrition Diagnoses:   Inadequate PO intake related to scheduled surgery as evidenced by NPO diet order. Underweight related to energy intake and body frame as evidenced by a BMI of 17 and 86% IBW. Nutrition Risk:  [] High  [x] Moderate []  Low    SUBJECTIVE/OBJECTIVE:   Pt admitted for left hip fracture and HTN. Ortho consulted: plan for OR today for percutaneous pinning of left femoral neck fracture. Pt seen in room this morning resting. NKFA or problems chewing/swallowing and stable weight PTA. Reports having good appetite and normally consuming 3 meals per day at home. Stated she has been this weight the majority of her adult life. Menu provided for when diet resumed to implement preferences with dietary. Will continue to monitor. Information Obtained from:    [x] Chart Review   [x] Patient   [] Family/Caregiver   [] Nurse/Physician   [] Interdisciplinary Meeting/Rounds      Diet: NPO  Medications: [x] Reviewed  IVF: NS @75 mL/hr  Hydrodiuril, Zestril, Zocor    Allergies: [x] Reviewed   Encounter Diagnoses     ICD-10-CM ICD-9-CM   1. [facility] of left [facility] S72.002A 820.8   2. Essential hypertension I10 401.9   3. Fall on same level from slipping, tripping or stumbling, initial encounter [address]01. Barbara Hoyt G064.6     Past Medical History:   Diagnosis Date    Arthritis     Hypertension       Labs:    Lab Results   Component Value Date/Time    Sodium 139 10/10/2019 04:35 AM    Potassium 3.5 10/10/2019 04:35 AM    Chloride 104 10/10/2019 04:35 AM    CO2 24 10/10/2019 04:35 AM    Anion gap 11 10/10/2019 04:35 AM    Glucose 120 (H) 10/10/2019 04:35 AM    BUN 22 (H) 10/10/2019 04:35 AM    Creatinine 0.73 10/10/2019 04:35 AM    Calcium 8.6 10/10/2019 04:35 AM    Magnesium 1.3 (L) 10/10/2019 04:35 AM    Phosphorus 2.5 10/10/2019 04:35 AM    Albumin 4.4 10/09/2019 03:23 PM     Anthropometrics: BMI (calculated): 17  Last 3 Recorded Weights in this Encounter    10/09/19 1339   Weight: 40.8 kg (90 lb)      Ht Readings from Last 1 Encounters:   10/09/19 5' 1\" (1.549 m)       Documented Weight History:  Weight Metrics 10/9/2019 8/28/2014 8/27/2014 8/21/2014   Weight 90 lb - 78 lb 78 lb   BMI 17.01 kg/m2 15.23 kg/m2 - 15.23 kg/m2       Patient Vitals for the past 100 hrs:   % Diet Eaten   10/09/19 1824 90 %         IBW: 105 lb %IBW: 86% UBW: 90 lb   [] Weight Loss [] Weight Gain [x] Weight Stable    Estimated Nutrition Needs: [x] MSJ x 1.3 [x] Other: 35 kcal/kg  Calories: 5142-6765 kcal Based on:   [x] Actual BW    Protein:   60 g Based on:   [x] Actual BW    Fluid:       1500 ml Based on:   [x] Actual BW      [x] No Cultural, Faith or ethnic dietary need identified.     [] Cultural, Faith and ethnic food preferences identified and addressed     Wt Status:  [] Normal (18.6 - 24.9) [x] Underweight (<18.5) [] Overweight (25 - 29.9) [] Mild Obesity (30 - 34.9)  [] Moderate Obesity (35 - 39.9) [] Morbid Obesity (40+)       Nutrition Problems Identified:   [x] Suboptimal PO intake v/s NPO for surgery  [] Food Allergies  [] Difficulty chewing/swallowing/poor dentition  [] Constipation/Diarrhea   [] Nausea/Vomiting   [] None  [] Other:     Plan:   [x] Therapeutic Diet once resumed  [x]  Obtained/adjusted food preferences/tolerances and/or snacks options   []  Supplements added   [] Occupational therapy following for feeding techniques  []  HS snack added   []  Modify diet texture   []  Modify diet for food allergies   []  Educate patient   []  Assist with menu selection   [x]  Monitor PO intake on meal rounds when diet advanced   [x]  Continue inpatient monitoring and intervention   [x]  Participated in discharge planning/Interdisciplinary rounds/Team meetings   []  Other:     Education Needs:   [x] Not appropriate for teaching at this time due to: NPO   [] Identified and addressed    Nutrition Monitoring and Evaluation:  [x] Continue ongoing monitoring and intervention  [] Other    [employer]

## 2019-10-10 NOTE — PROGRESS NOTES
Nursing assessment complete. Patient resting in bed watching tv  2330 new PIV started. 2356 patient hypertensive. Dr Denilson Pham notified. Orders received  Bedside shift change report given to linnette (oncoming nurse) by Hallie Avila RN   (offgoing nurse). Report included the following information SBAR, MAR and Recent Results.

## 2019-10-10 NOTE — PROGRESS NOTES
0725.Bedside and Verbal shift change report given to this nurse Ruddy Almaraz (oncoming nurse) by Hallie Avila (offgoing nurse). Report included the following information SBAR, Kardex and MAR. Patient in bed with HOB elevated, bed locked at lowest position, call bell in reach. Patient IV infusing in RA, c/d/i, no swelling, erythema, or infiltration noted at this time. Patient denies n/v/sob and pain and shows no signs of distress at this time. Will continue to monitor. 1925. Bedside and Verbal shift change report given to Tim Almendarez (oncoming nurse) by this nurse Ruddy Almaraz (offgoing nurse). Report included the following information SBAR, Kardex and MAR.

## 2019-10-10 NOTE — PROGRESS NOTES
Problem: Falls - Risk of  Goal: *Absence of Falls  Description  Document Kristie Ward Fall Risk and appropriate interventions in the flowsheet. Outcome: Progressing Towards Goal  Note:   Fall Risk Interventions:  Mobility Interventions: Communicate number of staff needed for ambulation/transfer         Medication Interventions: Evaluate medications/consider consulting pharmacy    Elimination Interventions: Call light in reach    History of Falls Interventions: Evaluate medications/consider consulting pharmacy         Problem: Patient Education: Go to Patient Education Activity  Goal: Patient/Family Education  Outcome: Progressing Towards Goal     Problem: Pain  Goal: *Control of Pain  Outcome: Progressing Towards Goal     Problem: Patient Education: Go to Patient Education Activity  Goal: Patient/Family Education  Outcome: Progressing Towards Goal     Problem: Discharge Planning  Goal: *Discharge to safe environment  Outcome: Progressing Towards Goal     Problem: Pressure Injury - Risk of  Goal: *Prevention of pressure injury  Description  Document Sb Scale and appropriate interventions in the flowsheet.   Outcome: Progressing Towards Goal  Note:   Pressure Injury Interventions:     Reposition q2hrs         Activity Interventions: Pressure redistribution bed/mattress(bed type)    Mobility Interventions: HOB 30 degrees or less    Nutrition Interventions: Document food/fluid/supplement intake                     Problem: Patient Education: Go to Patient Education Activity  Goal: Patient/Family Education  Outcome: Progressing Towards Goal

## 2019-10-10 NOTE — PROGRESS NOTES
Progress Note      Patient: Sadaf Younger               Sex: female          DOA: 10/9/2019       YOB: 1950      Age:  77 y.o.        LOS:  LOS: 1 day             CHIEF COMPLAINT:  Hip Pain and Fall      Assessment/Plan:     Principal Problem:    Hip fracture ([address] (10/9/2019)    Active Problems:    Essential hypertension (10/9/2019)        PLAN:  Left hip fracture  Ortho consulted, plan for OR today. Ortho ordered CT Left hip for preop planning  NPO  Pain control  IV fluids     Essential HTN  Elevated, likely due to pain. Cont home BP meds and monitor closely  PRN labetalol ordered     Tobacco abuse  Chronic smoker, recommend to quit smoking     DVT prophylaxis  SubQ heparin      Subjective:     Pt denies complaints, looking forward to surgery. Denies pain. Objective:     Visit Vitals  BP (!) 173/97 (BP 1 Location: Left arm, BP Patient Position: Supine)   Pulse (!) 114   Temp 98.9 °F (37.2 °C)   Resp 16   Ht 5' 1\" (1.549 m)   Wt 40.8 kg (90 lb)   SpO2 94%   BMI 17.01 kg/m²        Physical Exam:  Ears: hearing is intact  Eyes: Icterus is not present  Lungs: clear to auscultation bilaterally  Heart: regular rate and rhythm, S1, S2 normal, no murmur, click, rub or gallop  Gastrointestinal: soft, non-tender.  Bowel sounds normal. No masses,  no organomegaly  Neurological:  New Focal Deficits are not present  Psychiatric:  Mood is stable    Lab/Data Reviewed:  CMP:   Lab Results   Component Value Date/Time     10/10/2019 04:35 AM    K 3.5 10/10/2019 04:35 AM     10/10/2019 04:35 AM    CO2 24 10/10/2019 04:35 AM    AGAP 11 10/10/2019 04:35 AM     (H) 10/10/2019 04:35 AM    BUN 22 (H) 10/10/2019 04:35 AM    CREA 0.73 10/10/2019 04:35 AM    GFRAA >60 10/10/2019 04:35 AM    GFRNA >60 10/10/2019 04:35 AM    CA 8.6 10/10/2019 04:35 AM    MG 1.3 (L) 10/10/2019 04:35 AM    PHOS 2.5 10/10/2019 04:35 AM    ALB 4.4 10/09/2019 03:23 PM    TP 7.8 10/09/2019 03:23 PM    GLOB 3.4 10/09/2019 03:23 PM    AGRAT 1.3 10/09/2019 03:23 PM    SGOT 21 10/09/2019 03:23 PM    ALT 21 10/09/2019 03:23 PM     CBC:   Lab Results   Component Value Date/Time    WBC 11.8 10/10/2019 04:35 AM    HGB 11.2 (L) 10/10/2019 04:35 AM    HCT 33.5 (L) 10/10/2019 04:35 AM     10/10/2019 04:35 AM       Imaging Reviewed:  Xr Hip Lt W Or Wo Pelv 2-3 Vws    Result Date: 10/9/2019  2 VIEW LEFT HIP: INDICATION: Fall COMPARISON STUDY: None FINDINGS: subtle impacted subcapital femoral neck fracture as evidenced by slight angular deformity at its lateral margin and slight sclerosis of the impacted trabecula, seen on the AP view. No other fractures. The hip joint space is preserved. No osteolytic or blastic lesions. _______________     IMPRESSION: Subtle impacted subcapital femoral neck fracture. Ct Hip Lt Wo Cont    Result Date: 10/10/2019  CT EXTREMITY, hip, left W/O CONTRAST History: Trauma, for further assessment of articular status and fragments. CT technique: Serial axial noncontrast helical thin section CT performed through the area of interest. Coronal and/or  sagittal multiplanar reformats were reconstructed from axial helical data, as indicated for assessment of alignment and position of fragments and presurgical planning. Dose reduction technique: Automated exposure control, adjustment of the mAs and/or kVp according to the patient 's size, and iterative reconstruction techniques were used. Specifics for this study were placed by technologist staff into the patient's electronic medical record. Approximate dose, if determined, reference air kerma: Digital imaging and Communication in Medicine [DICOM] format image data are available to nonaffiliated external healthcare facilities or entities on a secure media free reciprocally searchable basis, with patient authorization, for at least a 12 month period after this study.  If not available when requested, the health care system, which is responsible for storage archival and delivery, should be contacted directly. No prior CT exams available. Comparison prior exam,  plain films, 10/9/2019 CT findings: EXTREMITY BONE: There is mild osteopenia. There is an impacted nondisplaced subcapital proximal left femoral neck fracture with mild valgus. No hip dislocation or gross joint space narrowing. Minimal capsular lucency probably from local hemarthrosis. Postsurgical lower lumbar spine with laminectomy and facet joint osteoarthritis. Small sacral canal meningeal root cysts or diverticula. EXTREMITY SOFT TISSUE: Grossly unremarkable. No evident discrete fluid collection/hematoma . IMPRESSION: 1. Osteopenia. Type I Garden subcapital left proximal femoral neck fracture.

## 2019-10-10 NOTE — PROGRESS NOTES
Problem: Falls - Risk of  Goal: *Absence of Falls  Description  Document Kieran Perez Fall Risk and appropriate interventions in the flowsheet.   Outcome: Progressing Towards Goal  Note:   Fall Risk Interventions:  Mobility Interventions: Communicate number of staff needed for ambulation/transfer                   History of Falls Interventions: Evaluate medications/consider consulting pharmacy         Problem: Patient Education: Go to Patient Education Activity  Goal: Patient/Family Education  Outcome: Progressing Towards Goal     Problem: Pain  Goal: *Control of Pain  Outcome: Progressing Towards Goal     Problem: Patient Education: Go to Patient Education Activity  Goal: Patient/Family Education  Outcome: Progressing Towards Goal

## 2019-10-10 NOTE — CONSULTS
Consult Note        Assessment:    1. Left femoral neck fracture, Garden 2      PLAN:    1. NPO  2. Bedrest  3. OR today for percutaneous pinning of left femoral neck fracture  4. CT Left hip for preop planning            HPI: Yvonne Kimbrough is a 77 y.o. female patient who has been admitted after a fall. Found to have a femoral neck fracture on the left. Denies history of blood thinner use or need for assistive devices for ambulation. Blood pressure (!) 173/97, pulse (!) 114, temperature 98.9 °F (37.2 °C), resp. rate 16, height 5' 1\" (1.549 m), weight 40.8 kg (90 lb), SpO2 94 %. CBC w/Diff   Lab Results   Component Value Date/Time    WBC 11.8 10/10/2019 04:35 AM    RBC 3.69 (L) 10/10/2019 04:35 AM    HGB 11.2 (L) 10/10/2019 04:35 AM    HCT 33.5 (L) 10/10/2019 04:35 AM    MCV 90.8 10/10/2019 04:35 AM    MCH 30.4 10/10/2019 04:35 AM    MCHC 33.4 10/10/2019 04:35 AM    RDW 12.4 10/10/2019 04:35 AM    Lab Results   Component Value Date/Time    MONOS 5 10/10/2019 04:35 AM    EOS 0 10/10/2019 04:35 AM    BASOS 0 10/10/2019 04:35 AM    RDW 12.4 10/10/2019 04:35 AM             Physical Assessment:  General: in no apparent distress, well developed and well nourished, non-toxic, in no respiratory distress and acyanotic, alert and oriented times 3   Wound: none ortho   Extremities:  NO TTP noted overlying c-spine, RUE, LUE, RLE or pain with ROM including hip. LLE - pain localized to groin. Neurovascular intact LLE. Compartments soft and NT distal to fracture site. Plantar and dorsiflexion intact. Palpable DP/PT pulses noted. Denies paresthesias   Dressing:  none ortho   DVT Exam:     No exam evidence to suggest DVT. Compartments soft and NT.           Radiology:   2 VIEW LEFT HIP:     INDICATION: Fall     COMPARISON STUDY: None     FINDINGS: subtle impacted subcapital femoral neck fracture as evidenced by  slight angular deformity at its lateral margin and slight sclerosis of the  impacted trabecula, seen on the AP view. No other fractures. The hip joint space  is preserved. No osteolytic or blastic lesions.     _______________     IMPRESSION  IMPRESSION:     Subtle impacted subcapital femoral neck fracture. Elyssa York PA-C  10/10/2019    Office [phone]    Pt.  Seen and examined; independent eval despite this accurate work product; GLF; left hip pain; Garden II femoral neck fracture on x-ray; clinically with pain on movement c/w fracture; plan open fixation with cannulated screws; NPO, consented, skin marked; proceed

## 2019-10-11 VITALS
TEMPERATURE: 98.3 F | BODY MASS INDEX: 17.75 KG/M2 | RESPIRATION RATE: 18 BRPM | OXYGEN SATURATION: 97 % | HEIGHT: 61 IN | SYSTOLIC BLOOD PRESSURE: 156 MMHG | DIASTOLIC BLOOD PRESSURE: 93 MMHG | HEART RATE: 112 BPM | WEIGHT: 94 LBS

## 2019-10-11 LAB
ANION GAP SERPL CALC-SCNC: 12 MMOL/L (ref 3–18)
BASOPHILS # BLD: 0 K/UL (ref 0–0.1)
BASOPHILS NFR BLD: 0 % (ref 0–2)
BUN SERPL-MCNC: 12 MG/DL (ref 7–18)
BUN/CREAT SERPL: 18 (ref 12–20)
CALCIUM SERPL-MCNC: 7.6 MG/DL (ref 8.5–10.1)
CHLORIDE SERPL-SCNC: 101 MMOL/L (ref 100–111)
CO2 SERPL-SCNC: 23 MMOL/L (ref 21–32)
CREAT SERPL-MCNC: 0.68 MG/DL (ref 0.6–1.3)
DIFFERENTIAL METHOD BLD: ABNORMAL
EOSINOPHIL # BLD: 0 K/UL (ref 0–0.4)
EOSINOPHIL NFR BLD: 0 % (ref 0–5)
ERYTHROCYTE [DISTWIDTH] IN BLOOD BY AUTOMATED COUNT: 12.4 % (ref 11.6–14.5)
GLUCOSE SERPL-MCNC: 137 MG/DL (ref 74–99)
HCT VFR BLD AUTO: 33.6 % (ref 35–45)
HGB BLD-MCNC: 11.1 G/DL (ref 12–16)
LYMPHOCYTES # BLD: 1.1 K/UL (ref 0.9–3.6)
LYMPHOCYTES NFR BLD: 9 % (ref 21–52)
MAGNESIUM SERPL-MCNC: 1.2 MG/DL (ref 1.6–2.6)
MCH RBC QN AUTO: 29.9 PG (ref 24–34)
MCHC RBC AUTO-ENTMCNC: 33 G/DL (ref 31–37)
MCV RBC AUTO: 90.6 FL (ref 74–97)
MONOCYTES # BLD: 0.7 K/UL (ref 0.05–1.2)
MONOCYTES NFR BLD: 6 % (ref 3–10)
NEUTS SEG # BLD: 10.3 K/UL (ref 1.8–8)
NEUTS SEG NFR BLD: 85 % (ref 40–73)
PHOSPHATE SERPL-MCNC: 2.8 MG/DL (ref 2.5–4.9)
PLATELET # BLD AUTO: 239 K/UL (ref 135–420)
PMV BLD AUTO: 10.1 FL (ref 9.2–11.8)
POTASSIUM SERPL-SCNC: 3.1 MMOL/L (ref 3.5–5.5)
RBC # BLD AUTO: 3.71 M/UL (ref 4.2–5.3)
SODIUM SERPL-SCNC: 136 MMOL/L (ref 136–145)
WBC # BLD AUTO: 12 K/UL (ref 4.6–13.2)

## 2019-10-11 PROCEDURE — 36415 COLL VENOUS BLD VENIPUNCTURE: CPT

## 2019-10-11 PROCEDURE — 83735 ASSAY OF MAGNESIUM: CPT

## 2019-10-11 PROCEDURE — 74011250637 HC RX REV CODE- 250/637: Performed by: HOSPITALIST

## 2019-10-11 PROCEDURE — 74011250636 HC RX REV CODE- 250/636: Performed by: ORTHOPAEDIC SURGERY

## 2019-10-11 PROCEDURE — 85025 COMPLETE CBC W/AUTO DIFF WBC: CPT

## 2019-10-11 PROCEDURE — 80048 BASIC METABOLIC PNL TOTAL CA: CPT

## 2019-10-11 PROCEDURE — 77030038269 HC DRN EXT URIN PURWCK BARD -A

## 2019-10-11 PROCEDURE — 84100 ASSAY OF PHOSPHORUS: CPT

## 2019-10-11 PROCEDURE — 77030040361 HC SLV COMPR DVT MDII -B

## 2019-10-11 PROCEDURE — 74011250637 HC RX REV CODE- 250/637: Performed by: ORTHOPAEDIC SURGERY

## 2019-10-11 PROCEDURE — 97162 PT EVAL MOD COMPLEX 30 MIN: CPT

## 2019-10-11 RX ORDER — HYDROCODONE BITARTRATE AND ACETAMINOPHEN 5; 325 MG/1; MG/1
1 TABLET ORAL
Status: DISCONTINUED | OUTPATIENT
Start: 2019-10-11 | End: 2019-10-11 | Stop reason: HOSPADM

## 2019-10-11 RX ORDER — CEFAZOLIN SODIUM 2 G/50ML
2 SOLUTION INTRAVENOUS EVERY 8 HOURS
Status: DISCONTINUED | OUTPATIENT
Start: 2019-10-11 | End: 2019-10-11 | Stop reason: HOSPADM

## 2019-10-11 RX ORDER — HYDROCODONE BITARTRATE AND ACETAMINOPHEN 5; 325 MG/1; MG/1
1 TABLET ORAL
Qty: 12 TAB | Refills: 0 | Status: SHIPPED | OUTPATIENT
Start: 2019-10-11 | End: 2019-10-14

## 2019-10-11 RX ADMIN — SODIUM CHLORIDE 75 ML/HR: 900 INJECTION, SOLUTION INTRAVENOUS at 03:16

## 2019-10-11 RX ADMIN — SIMVASTATIN 40 MG: 40 TABLET, FILM COATED ORAL at 00:32

## 2019-10-11 RX ADMIN — MORPHINE SULFATE 2 MG: 2 INJECTION, SOLUTION INTRAMUSCULAR; INTRAVENOUS at 03:07

## 2019-10-11 RX ADMIN — HYDROCHLOROTHIAZIDE 25 MG: 25 TABLET ORAL at 08:21

## 2019-10-11 RX ADMIN — HEPARIN SODIUM 5000 UNITS: 5000 INJECTION INTRAVENOUS; SUBCUTANEOUS at 06:06

## 2019-10-11 RX ADMIN — LISINOPRIL 40 MG: 40 TABLET ORAL at 08:21

## 2019-10-11 RX ADMIN — CEFAZOLIN 2 G: 10 INJECTION, POWDER, FOR SOLUTION INTRAVENOUS at 06:07

## 2019-10-11 NOTE — DISCHARGE INSTRUCTIONS
Patient Education     DISCHARGE SUMMARY from Nurse    PATIENT INSTRUCTIONS:    After general anesthesia or intravenous sedation, for 24 hours or while taking prescription Narcotics:  · Limit your activities  · Do not drive and operate hazardous machinery  · Do not make important personal or business decisions  · Do  not drink alcoholic beverages  · If you have not urinated within 8 hours after discharge, please contact your surgeon on call. Report the following to your surgeon:  · Excessive pain, swelling, redness or odor of or around the surgical area  · Temperature over 100.5  · Nausea and vomiting lasting longer than 4 hours or if unable to take medications  · Any signs of decreased circulation or nerve impairment to extremity: change in color, persistent  numbness, tingling, coldness or increase pain  · Any questions    What to do at Home:  Recommended activity: Activity as tolerated. If you experience any of the following symptoms listed in your discharge instructions, please follow up with your primary care physician. *  Please give a list of your current medications to your Primary Care Provider. *  Please update this list whenever your medications are discontinued, doses are      changed, or new medications (including over-the-counter products) are added. *  Please carry medication information at all times in case of emergency situations. These are general instructions for a healthy lifestyle:    No smoking/ No tobacco products/ Avoid exposure to second hand smoke  Surgeon General's Warning:  Quitting smoking now greatly reduces serious risk to your health.     Obesity, smoking, and sedentary lifestyle greatly increases your risk for illness    A healthy diet, regular physical exercise & weight monitoring are important for maintaining a healthy lifestyle    You may be retaining fluid if you have a history of heart failure or if you experience any of the following symptoms:  Weight gain of 3 pounds or more overnight or 5 pounds in a week, increased swelling in our hands or feet or shortness of breath while lying flat in bed. Please call your doctor as soon as you notice any of these symptoms; do not wait until your next office visit. The discharge information has been reviewed with the patient. The patient verbalized understanding. Discharge medications reviewed with the patient and appropriate educational materials and side effects teaching were provided. Patient armband removed and shredded      ___________________________________________________________________________________________________________________________________     Surgery to Repair a Hip Fracture: What to Expect at Home  Your Recovery    Surgery for a hip fracture repairs a broken hip bone. When you leave the hospital after surgery, you will probably be walking with crutches or a walker. You may be able to climb a few stairs and get in and out of bed and chairs. But you will need someone to help you at home for the next few weeks or until you have more energy and can move around better. If there is no one to help you at home, you may go to a rehabilitation center or long-term care center. You will go home with a bandage and stitches or staples. You can remove the bandage when your doctor tells you to. Your doctor will remove your stitches or staples 10 days to 3 weeks after your surgery. You may still have some mild pain, and the area may be swollen for 3 to 4 months after surgery. Your doctor will give you medicine for the pain. You will continue the rehabilitation program (rehab) you started in the hospital. The better you do with your rehab exercises, the quicker you will get your strength and movement back. Most people are able to return to work 4 weeks to 4 months after surgery. But it may take 6 months to 1 year for you to fully recover.  Some people, especially older people, are never able to move quite as well as they used to. You heal best when you take good care of yourself. Eat a variety of healthy foods, and don't smoke. This care sheet gives you a general idea about how long it will take for you to recover. But each person recovers at a different pace. Follow the steps below to get better as quickly as possible. How can you care for yourself at home? Activity    · Rest when you feel tired. You may take a nap, but do not stay in bed all day.     · Work with your physical therapist to learn the best way to exercise. You may be able to take frequent, short walks using crutches or a walker. You will probably have to use crutches or a walker for at least 4 to 6 weeks. After that, you may need to use a cane to help you walk.     · Do not sit for longer than 30 to 45 minutes at a time. When you sit, use chairs with arms, and do not sit in low chairs.     · Sleep on your back with your legs slightly apart or on your side with a pillow between your knees for about 6 weeks or as your doctor tells you. Do not sleep on your stomach or affected hip.     · You may need to take sponge baths until your stitches or staples have been removed. You will probably be able to shower 24 hours after they are removed. Ask your doctor when it is okay to bathe or shower.     · Ask your doctor when you can drive again.     · Most people are able to return to work 4 weeks to 4 months after surgery.     · Ask your doctor when it is okay for you to have sex.     · Do not lift anything that would make you strain. This may include heavy grocery bags and milk containers, a heavy briefcase or backpack, cat litter or dog food bags, a vacuum , or a child. Diet    · By the time you leave the hospital, you will probably be eating your normal diet. If your stomach is upset, try bland, low-fat foods like plain rice, broiled chicken, toast, and yogurt.  Your doctor may recommend that you take iron and vitamin supplements.     · Continue to drink plenty of fluids.     · Eat healthy foods, and watch your portion sizes. Try to stay at your ideal weight. Too much weight puts more stress on your hip joint.     · You may notice that your bowel movements are not regular right after your surgery. This is common. Try to avoid constipation and straining with bowel movements. You may want to take a fiber supplement every day. If you have not had a bowel movement after a couple of days, ask your doctor about taking a mild laxative.     · Your doctor may want you to take calcium supplements and eat foods high in calcium, such as milk, cheese, ice cream, and salmon with bones. These help stop bone loss. Orange juice and soy milk with added calcium are also good choices. Medicines    · Your doctor will tell you if and when you can restart your medicines. He or she will also give you instructions about taking any new medicines.     · If you take blood thinners, such as warfarin (Coumadin), clopidogrel (Plavix), or aspirin, be sure to talk to your doctor. He or she will tell you if and when to start taking those medicines again. Make sure that you understand exactly what your doctor wants you to do.     · Your doctor may give you a blood-thinning medicine to prevent blood clots. If you take a blood thinner, be sure you get instructions about how to take your medicine safely. Blood thinners can cause serious bleeding problems. This medicine could be in pill form or as a shot (injection). If a shot is necessary, your doctor will tell you how to do this.     · Be safe with medicines. Take pain medicines exactly as directed. ? If the doctor gave you a prescription medicine for pain, take it as prescribed. ? If you are not taking a prescription pain medicine, ask your doctor if you can take an over-the-counter medicine.     · If you think your pain medicine is making you sick to your stomach:  ? Take your medicine after meals (unless your doctor has told you not to). ?  Ask your doctor for a different pain medicine.     · If your doctor prescribed antibiotics, take them as directed. Do not stop taking them just because you feel better. You need to take the full course of antibiotics.     · Your doctor may also prescribe medicines or calcium supplements to make your bones stronger. Incision care    · You will have a bandage over the cut (incision) and staples or stitches. If there is no drainage, most doctors will let you take the bandage off in a few days.     · Your doctor will remove the staples or stitches 10 days to 3 weeks after the surgery and replace them with strips of tape. Leave the tape on for a week or until it falls off. Exercise    · Your rehab program will include a number of exercises to do. Always do them as your therapist tells you.     · Do not do any vigorous exercise for 12 weeks or until your doctor tells you it is okay. Ice and elevation    · For pain, put ice or a cold pack on the area for 10 to 20 minutes at a time. Put a thin cloth between the ice and your skin.     · Your ankle may swell for about 3 months. Prop up your ankle when you ice it or anytime you sit or lie down. Try to keep it above the level of your heart. This will help reduce swelling. Other instructions    · Continue to wear your support stockings as your doctor says. These help to prevent blood clots. The length of time that you will have to wear them depends on your activity level and the amount of swelling you have. Most people wear these stockings for 4 to 6 weeks after surgery.    Preventing falls is also very important. To prevent falls:    · Arrange furniture so that you will not trip on it.     · Get rid of throw rugs, and move electrical cords out of the way.     · Walk only in areas with plenty of light.     · Put grab bars in showers and bathtubs.     · Avoid icy or snowy sidewalks.     · Wear shoes with sturdy, flat soles.    Follow-up care is a key part of your treatment and safety. Be sure to make and go to all appointments, and call your doctor if you are having problems. It's also a good idea to know your test results and keep a list of the medicines you take. When should you call for help? Call 911 anytime you think you may need emergency care. For example, call if:    · You passed out (lost consciousness).     · You have severe trouble breathing.     · You have sudden chest pain and shortness of breath, or you cough up blood.    Call your doctor now or seek immediate medical care if:    · Your leg or foot is cool or pale or changes color.     · You cannot feel or move your leg.     · You have signs of a blood clot, such as:  ? Pain in your calf, back of the knee, thigh, or groin. ? Redness and swelling in your leg or groin.     · Your incision comes open and begins to bleed, or the bleeding increases.     · You feel like your heart is racing or beating irregularly.     · You have signs of infection, such as:  ? Increased pain, swelling, warmth, or redness. ? Red streaks leading from the incision. ? Pus draining from the incision. ? A fever.    Watch closely for any changes in your health, and be sure to contact your doctor if:    · You do not have a bowel movement after taking a laxative.     · You do not get better as expected. Where can you learn more? Go to http://eladio-josé miguel.info/. Enter I586 in the search box to learn more about \"Surgery to Repair a Hip Fracture: What to Expect at Home. \"  Current as of: June 26, 2019  Content Version: 12.2  © 3093-2967 [employer]. Care instructions adapted under license by [employer] (which disclaims liability or warranty for this information). If you have questions about a medical condition or this instruction, always ask your healthcare professional. [address] any warranty or liability for your use of this information.

## 2019-10-11 NOTE — PROGRESS NOTES
0738.Bedside and Verbal shift change report given to this nurse Rosie Kamara (oncoming nurse) by Jazmyn Borges (offgoing nurse). Report included the following information SBAR, Kardex and MAR.    1400 d/c instructions given to patient. Patient states understanding of instructions and has no further questions at this time.

## 2019-10-11 NOTE — OP NOTES
[facility]  OPERATIVE REPORT    Name:  Any Cuba  MR#:   [id number]  :  1950  ACCOUNT #:  [de-identified]  DATE OF SERVICE:  10/10/2019    PREOPERATIVE DIAGNOSIS:  Impacted left subcapital femoral neck fracture status post fall. POSTOPERATIVE DIAGNOSIS:  Impacted left subcapital femoral neck fracture status post fall. PROCEDURE PERFORMED:  Open fixation of left subcapital femoral neck fracture with cannulated screws x3. SURGEON:  Zheng Quintanilla MD    ASSISTANT:  Willam Matta, surgical first assist used for retraction and closure. ANESTHESIA:  General endotracheal.    COMPLICATIONS:  None. SPECIMENS REMOVED:  None. DRAINS:  None. IMPLANTS:  Synthes cannulated screws, 7.3 mm in diameter, 75 mm in length x2, and 180 mm length screw with a single washer inferior central screw. ESTIMATED BLOOD LOSS:  Minimal.    TOURNIQUET:  No tourniquet. DISPOSITION:  To the recovery room in stable condition. COUNTS:  Correct counts x2. INDICATION:  This is a 73-year-old female, ground-level fall yesterday. X-rays on admission to the emergency room showed an impacted femoral neck fracture. This fracture is amenable to fixation to avoid sequela of nonunion, malunion, and to allow for functional recovery. PROCEDURE:  After informed consent was obtained detailing the risks, benefits, alternatives, and providing no guarantees, the patient being n.p.o., perioperative IV antibiotics and anesthesia evaluation, she was wheeled from the holding area to the operative theater. She underwent rapid sequence induction with endotracheal intubation without difficulty. She was placed in traction boots. She was placed on the [place] table with a well-padded perineum post placed. She was brought up against the post.  We really did not use any longitudinal traction.   We just kind of secured the leg and maintained some rotation, enough to visualize the femoral neck of the fracture and the lateral proximal femur. AP and lateral fluoroscopic images were used to visualize the femoral neck, which was well visualized. A standard sterile orthopedic prep and drape then followed. Time-out procedure was performed confirming site, location, and plan for surgery. A 1-inch incision was made laterally. Dissection was taken down through the IT band and the vastus fascia down to bone. A terminally threaded 3.2 mm guidewire was advanced in a inferior and center position in the femoral neck and into the femoral head, stopping at the subchondral bone. Two successive screws were placed slightly cephalad to that, one anterior and one posterior to complete an inverted triangle. All pins had appropriate placement and were measured with a depth gauge. Lateral cortex was then reamed and 75 mm screws were placed at the posterior cephalad screw, 80 mm screw was placed at the cephalad anterior screw, and a 75 mm screw with a washer was placed at the inferior center position. All these were short threaded and cannulated. They had excellent fixation and good purchase of bone. Fracture maintained good alignment, was in slight valgus, and impacted. Once the screws were placed and appropriately tensioned against the lateral cortex without penetration of subchondral bone, we copiously irrigated the depths of our exposure, performed a layered closure, staples for the skin, and a sterile dressing was applied. The patient was then reversed, extubated, and mobilized to supine position into her hospital bed and received in the recovery room in stable and satisfactory condition.         MD CHAPARRO [email or web address]  D:  10/10/2019 23:49  T:  10/11/2019 7:16  JOB #:  [id number]

## 2019-10-11 NOTE — PROGRESS NOTES
Problem: Discharge Planning  Goal: *Discharge to safe environment  Outcome: Met  Plan is home. Care Management Interventions  PCP Verified by CM: Yes  Palliative Care Criteria Met (RRAT>21 & CHF Dx)?: No  Transition of Care Consult (CM Consult): Discharge Planning  Current Support Network: Other(Roomate)  Plan discussed with Pt/Family/Caregiver: Yes  Discharge Location  Discharge Placement: Home      Patient is to dc today. I went on rounds with Dr Marco Velasquez to see her. He asked her if she needed Home Health and she said no, so MD says no Home Health at dc. She has a rolling walker at home. Her friend is coming to pick her up for dc. Christy Machado.  EDIL Liao  [facility]  [phone], Pager [phone]  [email or web address]

## 2019-10-11 NOTE — BRIEF OP NOTE
BRIEF OPERATIVE NOTE    Date of Procedure: 10/10/2019   Preoperative Diagnosis: Impacted left subcapital femoral neck fracture  Postoperative Diagnosis: Same  Procedure(s):  OPEN FIXATION OF THE LEFT FEMORAL NECK FRACTURE WITH CANNULATED SCREWS X 3  Surgeon(s) and Role:     * Aurora Mcarthur MD - Primary         Surgical Assistant: BETTY Quiroga (retraction and closure)    Surgical Staff:  Taiwo Star: Prakash Neal RN  Radiology Technician: Jagruti Welch, ALYSON, RT, NM, ARRT  Scrub Tech-1: Catarina Bleak  Surg Asst-1: Clara Brakeman  Event Time In Time Out   Incision Start 10/10/2019 2312    Incision Close       Anesthesia: General   Estimated Blood Loss: Minimal  Specimens: None   Findings: As above   Complications: None  Implants:   Implant Name Type Inv. Item Serial No.  Lot No. LRB No. Used Action   SCR RIA [place] 16 THRD 7.3X75 -- SS - [id number]  SCR RIA [place] 16 THRD 7.3X75 -- SS  [employer] [place] [id number] Left 1 Implanted   SCR RIA [place] 16 THRD 7.3X80 -- SS - [id number]  SCR RIA [place] 16 THRD 7.3X80 -- SS  SY[employer] [place] [id number] Left 2 Implanted   WASHER MINI SCR 13. 0MM SS --  - [id number]  WASHER MINI SCR 13. 0MM SS --   SYNTHES [place] [id number] Left 1 Implanted

## 2019-10-11 NOTE — DISCHARGE SUMMARY
Discharge Summary    Patient: Alexys Cooper               Sex: female          DOA: 10/9/2019       YOB: 1950      Age:  77 y.o.        LOS:  LOS: 2 days                Admit Date: 10/9/2019    Discharge Date: 10/11/2019    Admission Diagnoses: Hip fracture ([facility] [address] Maikol Garcia  Essential hypertension [I10]    Discharge Diagnoses:    Hospital Problems  Date Reviewed: 10/10/2019          Codes Class Noted POA    * (Principal) Hip fracture ([address] ICD-10-CM: S72.009A  ICD-9-CM: 820.8  10/9/2019 Unknown        Essential hypertension ICD-10-CM: I10  ICD-9-CM: 401.9  10/9/2019 Unknown              [place] [facility] [place] Condition:  Improved    Hospital Course:  68F who presented with mechanical fall after tripping on dog leash, landing on her left hip. XR showed subcapital femoral neck fracture. Orthopedics was consulted and performed open fixation of left subcapital femoral neck fracture with cannulated screws x3. Pt did well post-operatively, pain was controlled and she was ambulating. She wanted to be discharged home. Cleared by Orthopedics for discharge. She is to f/u Dr. Marina Damon in 2 weeks. Consults:    Orthopedics    Labs:  Labs: Results:       Chemistry Recent Labs     10/11/19  0420 10/10/19  0435 10/09/19  1523   * 120* 112*    139 137   K 3.1* 3.5 3.8    104 103   CO2 23 24 27   BUN 12 22* 38*   CREA 0.68 0.73 1.12   CA 7.6* 8.6 9.9   AGAP 12 11 7   BUCR 18 30* 34*   AP  --   --  84   TP  --   --  7.8   ALB  --   --  4.4   GLOB  --   --  3.4   AGRAT  --   --  1.3      CBC w/Diff Recent Labs     10/11/19  0420 10/10/19  0435 10/09/19  1523   WBC 12.0 11.8 10.8   RBC 3.71* 3.69* 3.95*   HGB 11.1* 11.2* 12.0   HCT 33.6* 33.5* 36.1    267 278   GRANS 85* 82* 73   LYMPH 9* 13* 20*   EOS 0 0 1      Cardiac Enzymes No results for input(s): CPK, CKND1, JAMARCUS in the last 72 hours.     No lab exists for component: CKRMB, TROIP   Coagulation Recent Labs     10/09/19  1523   PTP 13.2   INR 1.0   APTT 32.1       Lipid Panel Lab Results   Component Value Date/Time    Cholesterol, total 190 10/27/2017 09:35 AM    HDL Cholesterol 108 (H) 10/27/2017 09:35 AM    LDL, calculated 68.2 10/27/2017 09:35 AM    VLDL, calculated 13.8 10/27/2017 09:35 AM    Triglyceride 69 10/27/2017 09:35 AM    CHOL/HDL Ratio 1.8 10/27/2017 09:35 AM      BNP No results for input(s): BNPP in the last 72 hours. Liver Enzymes Recent Labs     10/09/19  1523   TP 7.8   ALB 4.4   AP 84   SGOT 21      Thyroid Studies Lab Results   Component Value Date/Time    TSH 1.49 10/27/2017 09:35 AM            Significant Diagnostic Studies:   Xr Hip Lt W Or Wo Pelv 2-3 Vws    Result Date: 10/11/2019  EXAM: XR HIP LT W OR WO PELV 2-3 VWS CLINICAL INDICATION/HISTORY: Left hip pain  COMPARISON: Left hip CT 10/10/2019 and left hip radiographs 10/9/2019 TECHNIQUE: Multiple AP views of the left hip were obtained. _______________ FINDINGS:  Percutaneous pinning of subcapital left femoral fracture seen preoperatively. Adjacent left acetabulum remains intact. Persistent anatomic alignment of the left hip. Small left hip joint effusion. Expected soft tissue swelling and emphysema. _______________     IMPRESSION: Percutaneous pinning of subcapital left femoral fracture with no evident complication. Xr Hip Lt W Or Wo Pelv 2-3 Vws    Result Date: 10/9/2019  2 VIEW LEFT HIP: INDICATION: Fall COMPARISON STUDY: None FINDINGS: subtle impacted subcapital femoral neck fracture as evidenced by slight angular deformity at its lateral margin and slight sclerosis of the impacted trabecula, seen on the AP view. No other fractures. The hip joint space is preserved. No osteolytic or blastic lesions. _______________     IMPRESSION: Subtle impacted subcapital femoral neck fracture.     Ct Hip Lt Wo Cont    Result Date: 10/10/2019  CT EXTREMITY, hip, left W/O CONTRAST History: Trauma, for further assessment of articular status and fragments. CT technique: Serial axial noncontrast helical thin section CT performed through the area of interest. Coronal and/or  sagittal multiplanar reformats were reconstructed from axial helical data, as indicated for assessment of alignment and position of fragments and presurgical planning. Dose reduction technique: Automated exposure control, adjustment of the mAs and/or kVp according to the patient 's size, and iterative reconstruction techniques were used. Specifics for this study were placed by technologist staff into the patient's electronic medical record. Approximate dose, if determined, reference air kerma: Digital imaging and Communication in Medicine [DICOM] format image data are available to nonaffiliated external healthcare facilities or entities on a secure media free reciprocally searchable basis, with patient authorization, for at least a 12 month period after this study. If not available when requested, the health care system, which is responsible for storage archival and delivery, should be contacted directly. No prior CT exams available. Comparison prior exam,  plain films, 10/9/2019 CT findings: EXTREMITY BONE: There is mild osteopenia. There is an impacted nondisplaced subcapital proximal left femoral neck fracture with mild valgus. No hip dislocation or gross joint space narrowing. Minimal capsular lucency probably from local hemarthrosis. Postsurgical lower lumbar spine with laminectomy and facet joint osteoarthritis. Small sacral canal meningeal root cysts or diverticula. EXTREMITY SOFT TISSUE: Grossly unremarkable. No evident discrete fluid collection/hematoma . IMPRESSION: 1. Osteopenia. Type I [place] subcapital left proximal femoral neck fracture.        Discharge Medications:     Current Discharge Medication List      START taking these medications    Details   HYDROcodone-acetaminophen (NORCO) 5-325 mg per tablet Take 1 Tab by mouth every four (4) hours as needed for Pain for up to 3 days. Max Daily Amount: 6 Tabs. Qty: 12 Tab, Refills: 0    Associated Diagnoses: Closed fracture of neck of left femur, initial encounter ([facility] [address]         CONTINUE these medications which have NOT CHANGED    Details   lisinopril (PRINIVIL, ZESTRIL) 20 mg tablet Take 20 mg by mouth daily. simvastatin (ZOCOR) 40 mg tablet       lisinopril-hydrochlorothiazide (PRINZIDE, ZESTORETIC) 20-25 mg per tablet              Activity: Activity as tolerated    Diet: Cardiac Diet    Follow-up: PCP in 1 week. Orthopedics Dr. Sekou Arias in 2 weeks.          Total time spent including time spent on final examination and discharge discussion, discharge documentation and records reviewed and medication reconciliation: > 30 minutes    Jeny Stewart MD  [facility]

## 2019-10-11 NOTE — PERIOP NOTES
Patient transferred to [facility] [id number] in good condition via bed friend Augie Wynn updated on status

## 2019-10-11 NOTE — PERIOP NOTES
TRANSFER - OUT REPORT:    Verbal report given to beth rn(name) on Thanh Redd  being transferred to 2208(unit) for routine post - op       Report consisted of patients Situation, Background, Assessment and   Recommendations(SBAR). Information from the following report(s) SBAR, OR Summary, Intake/Output and MAR was reviewed with the receiving nurse. Lines:   Peripheral IV 10/10/19 Anterior;Right Forearm (Active)   Site Assessment Clean, dry, & intact 10/10/2019 11:49 PM   Phlebitis Assessment 0 10/10/2019 11:49 PM   Infiltration Assessment 0 10/10/2019 11:49 PM   Dressing Status Clean, dry, & intact 10/10/2019 11:49 PM   Dressing Type Transparent;Tape 10/10/2019 11:49 PM   Hub Color/Line Status Infusing;Blue 10/10/2019 11:49 PM   Action Taken Open ports on tubing capped 10/10/2019  8:00 PM   Alcohol Cap Used Yes 10/10/2019  8:00 PM        Opportunity for questions and clarification was provided.       Patient transported with:   Registered Nurse

## 2019-10-11 NOTE — ANESTHESIA PREPROCEDURE EVALUATION
Relevant Problems   No relevant active problems       Anesthetic History   No history of anesthetic complications            Review of Systems / Medical History  Patient summary reviewed and pertinent labs reviewed    Pulmonary  Within defined limits                 Neuro/Psych   Within defined limits           Cardiovascular    Hypertension: well controlled              Exercise tolerance: >4 METS     GI/Hepatic/Renal  Within defined limits              Endo/Other  Within defined limits           Other Findings   Comments:                  Physical Exam    Airway  Mallampati: II  TM Distance: < 4 cm  Neck ROM: normal range of motion   Mouth opening: Normal     Cardiovascular  Regular rate and rhythm,  S1 and S2 normal,  no murmur, click, rub, or gallop  Rhythm: regular  Rate: normal         Dental    Dentition: Edentulous     Pulmonary  Breath sounds clear to auscultation               Abdominal  GI exam deferred       Other Findings            Anesthetic Plan    ASA: 2  Anesthesia type: general          Induction: Intravenous  Anesthetic plan and risks discussed with: Patient

## 2019-10-11 NOTE — ANESTHESIA POSTPROCEDURE EVALUATION
Procedure(s):  OPEN FIXATION OF THE LEFT HIP FRACTURE USING SCREWS. general    Anesthesia Post Evaluation      Multimodal analgesia: multimodal analgesia used between 6 hours prior to anesthesia start to PACU discharge  Patient location during evaluation: bedside  Patient participation: complete - patient participated  Level of consciousness: awake  Pain management: adequate  Airway patency: patent  Anesthetic complications: no  Cardiovascular status: stable  Respiratory status: acceptable  Hydration status: acceptable  Post anesthesia nausea and vomiting:  controlled      Vitals Value Taken Time   /93 10/11/2019 12:04 AM   Temp 36.9 °C (98.4 °F) 10/11/2019 12:04 AM   Pulse 108 10/11/2019 12:07 AM   Resp 20 10/11/2019 12:07 AM   SpO2 99 % 10/11/2019 12:07 AM   Vitals shown include unvalidated device data.

## 2019-10-11 NOTE — ROUTINE PROCESS
Bedside and Verbal shift change report given to BELEN Renteria (oncoming nurse) by Tori Stock RN (offgoing nurse). Report included the following information SBAR, Kardex, Procedure Summary, Intake/Output and MAR.

## 2019-10-11 NOTE — PROGRESS NOTES
Problem: Mobility Impaired (Adult and Pediatric)  Goal: *Acute Goals and Plan of Care (Insert Text)  Outcome: Resolved/Met   PHYSICAL THERAPY EVALUATION AND DISCHARGE    Patient: Chata Contreras (89 y.o. female)  Date: 10/11/2019  Primary Diagnosis: Hip fracture ([facility] [address] [S72.009A]  Essential hypertension [I10]  Procedure(s) (LRB):  OPEN FIXATION OF THE LEFT HIP FRACTURE USING SCREWS (Left) 1 Day Post-Op   Precautions: Fall, PWB(LLE)  PWB LLE  PLOF: Independent  ASSESSMENT :  Orders for PWB LLE s/p ORIF left femur fracture; no designation of % PWB. Conservatively encourage patient to demonstrate close to NWB with ww. Non-compliant, demonstrates WBAT LLE. Refuses further education and mobility training; stating \"I had the right one done before. I'm good. Can I go home now? \"    Performed bed mobility mod I. Mod I for transfer to bedside commode with sit to stand to ww and amb 3ft with WBAT LLE. Verbal cues for compliance with WB restrictions; see above comments. Completed clean-up post commode with mod I. Returned to seated EOB mod I. Declines chair or further amb. Denies mobility concern with return home. Has ww as home. Education provided on bed mobility, transfers, ADLs, balance, amb, safety, exercise, role of PT, plan of care, cognition, skin integrity, vitals as indicated. Call mccarthy in reach. RN Myra notified. Skilled physical therapy is not indicated at this time. PLAN :  Discharge Recommendations: Home Health  Further Equipment Recommendations for Discharge: rolling walker; has     SUBJECTIVE:   Patient stated Zach Dietz had the right one done before. I'm good. Can I go home now? Bernadine Stewart    OBJECTIVE DATA SUMMARY:     Past Medical History:   Diagnosis Date    Arthritis     Hypertension      Past Surgical History:   Procedure Laterality Date    HX LUMBAR FUSION      1968    HX ORTHOPAEDIC      right hip replaced 2004     Barriers to Learning/Limitations: None  Compensate with: Visual Cues, Verbal Cues, Tactile Cues and Kinesthetic Cues  Home Situation:   Home Situation  Home Environment: Private residence  One/Two Story Residence: One story  Living Alone: No  Support Systems: Friends \ neighbors  Patient Expects to be Discharged to[de-identified] Private residence  Current DME Used/Available at Home: [employer] Gallagher  Critical Behavior:  Neurologic State: Alert  Orientation Level: Oriented X4  Cognition: Follows commands  Safety/Judgement: Fall prevention  Psychosocial  Patient Behaviors: Cooperative    Strength:    Manual Muscle Testing (LE) (per functional mobility)         R     L    Hip Flexion:   3+/5  3+/5  Knee EXT:   3+/5  3+/5  Knee FLEX:   3+/5  3+/5  Ankle DF:   3+/5  3+/5  _________________________________________________   Range Of Motion:  AROM WFL BLE  Functional Mobility:  Bed Mobility:  Supine to Sit: Modified independent  Transfers:  Sit to Stand: Modified independent  Stand to Sit: Modified independent  Bed to Chair: Modified independent  Balance:   Sitting: Intact  Standing: Impaired  Standing - Static: Good  Standing - Dynamic : Good  Ambulation/Gait Training:  Amb 2ft mod I with ww    Pain:  Pain level pre-treatment: 0/10   Pain level post-treatment: 0/10    Activity Tolerance:   Good    After treatment:   ?         Patient left in no apparent distress sitting up in chair  ? Patient left in no apparent distress in bed  ? Call bell left within reach  ? Nursing notified  ? Caregiver present  ? Bed alarm activated  ? SCDs applied    COMMUNICATION/EDUCATION:   ?         Role of physical therapy in the acute care setting. ?         Fall prevention education was provided and the patient/caregiver indicated understanding. ? Patient/family have participated as able in goal setting and plan of care. ?         Patient/family agree to work toward stated goals and plan of care.   ?         Patient understands intent and goals of therapy, but is neutral about his/her participation. ? Patient is unable to participate in goal setting/plan of care: ongoing with therapy staff.     Thank you for this referral.  Indiana Lassiter, PT   Time Calculation: 14 mins      Eval Complexity: History: MEDIUM  Complexity : 1-2 comorbidities / personal factors will impact the outcome/ POC Exam:MEDIUM Complexity : 3 Standardized tests and measures addressing body structure, function, activity limitation and / or participation in recreation  Presentation: MEDIUM Complexity : Evolving with changing characteristics  Clinical Decision Making:Medium Complexity clinical judgement; ROM, MMT, functional mobility  Overall Complexity:MEDIUM

## 2019-10-11 NOTE — PROGRESS NOTES
Orthopaedics  Pod 1    Patient without new complaints status post No admission procedures for hospital encounter. for Hip fracture ([facility] [address] Cherelle Watt  Essential hypertension [I10] 10/9/2019. No complaints. Voiding  Wants to go home    Visit Vitals  BP (!) 156/93 (BP 1 Location: Left arm, BP Patient Position: At rest)   Pulse (!) 112   Temp 98.3 °F (36.8 °C)   Resp 18   Ht 5' 1\" (1.549 m)   Wt 42.6 kg (94 lb)   SpO2 97%   BMI 17.76 kg/m²       CBC w/Diff    Lab Results   Component Value Date/Time    WBC 12.0 10/11/2019 04:20 AM    RBC 3.71 (L) 10/11/2019 04:20 AM    HCT 33.6 (L) 10/11/2019 04:20 AM    MCV 90.6 10/11/2019 04:20 AM    MCH 29.9 10/11/2019 04:20 AM    MCHC 33.0 10/11/2019 04:20 AM    RDW 12.4 10/11/2019 04:20 AM    Lab Results   Component Value Date/Time    MONOS 6 10/11/2019 04:20 AM    EOS 0 10/11/2019 04:20 AM    BASOS 0 10/11/2019 04:20 AM    RDW 12.4 10/11/2019 04:20 AM          Physical exam:  Dressing dry. AT, GS intact  Bilateral Lower Extremities. Calves soft and nontender. Assessment:  Status post No admission procedures for hospital encounter. for Hip fracture ([facility] [address] Cherelle Watt  Essential hypertension [I10] 10/9/2019,  Doing well s/p pinning l subcap fx    PLAN:  Home tdwb l  norco for home  rto Dr James Gonzalez 2 wks.     Sweta Bunn MD  October 11, 2019

## (undated) DEVICE — GUIDEWIRE ORTH L300MM DIA2.8MM S STL THRD SHRP TRCR TIP FOR

## (undated) DEVICE — SOLUTION IV 1000ML 0.9% SOD CHL

## (undated) DEVICE — SUTURE VCRL SZ 2-0 L27IN ABSRB UD L26MM SH 1/2 CIR J417H

## (undated) DEVICE — U-DRAPE: Brand: CONVERTORS

## (undated) DEVICE — PREP SKN CHLRAPRP 26ML TNT -- CONVERT TO ITEM 373320

## (undated) DEVICE — STERILE POLYISOPRENE POWDER-FREE SURGICAL GLOVES: Brand: PROTEXIS

## (undated) DEVICE — KIT PROC HIP PINNING CUST LF --

## (undated) DEVICE — OCCLUSIVE GAUZE STRIP,3% BISMUTH TRIBROMOPHENATE IN PETROLATUM BLEND: Brand: XEROFORM

## (undated) DEVICE — Device

## (undated) DEVICE — STAPLER SKIN H3.9MM WIRE DIA0.58MM CRWN 6.9MM 35 STPL FIX

## (undated) DEVICE — DRESSING TRNSPAR 5IN LEN 4IN W FLM ST BIOCL